# Patient Record
Sex: FEMALE | Race: ASIAN | NOT HISPANIC OR LATINO | Employment: FULL TIME | URBAN - METROPOLITAN AREA
[De-identification: names, ages, dates, MRNs, and addresses within clinical notes are randomized per-mention and may not be internally consistent; named-entity substitution may affect disease eponyms.]

---

## 2023-11-03 ENCOUNTER — OFFICE VISIT (OUTPATIENT)
Dept: ENDOCRINOLOGY | Facility: CLINIC | Age: 23
End: 2023-11-03
Payer: COMMERCIAL

## 2023-11-03 VITALS
BODY MASS INDEX: 18.69 KG/M2 | TEMPERATURE: 97.8 F | WEIGHT: 101.6 LBS | SYSTOLIC BLOOD PRESSURE: 110 MMHG | OXYGEN SATURATION: 98 % | DIASTOLIC BLOOD PRESSURE: 62 MMHG | HEIGHT: 62 IN | HEART RATE: 102 BPM

## 2023-11-03 DIAGNOSIS — E01.0 THYROMEGALY: ICD-10-CM

## 2023-11-03 DIAGNOSIS — E55.9 VITAMIN D DEFICIENCY: ICD-10-CM

## 2023-11-03 DIAGNOSIS — E05.90 HYPERTHYROIDISM: Primary | ICD-10-CM

## 2023-11-03 PROCEDURE — 99204 OFFICE O/P NEW MOD 45 MIN: CPT | Performed by: INTERNAL MEDICINE

## 2023-11-03 RX ORDER — METHIMAZOLE 5 MG/1
2.5 TABLET ORAL DAILY
COMMUNITY

## 2023-11-03 NOTE — PROGRESS NOTES
New patient Note      CC: Hyperthyroidism    History of Present Illness:   21 yr female with recently diagnosed hyperthyroidism. She was started on methimazole 5mg PO BID 3/23 and currently on 2.5mg qdaily. In August, she stopped methimazole for a couple of weeks and felt fatigued. No weight changes. She reports occasional palpitation but mostly asymptomatic. FHx: MGM and aunt had thyroid dysfunction. There is no problem list on file for this patient. History reviewed. No pertinent past medical history. History reviewed. No pertinent surgical history. History reviewed. No pertinent family history. Social History     Tobacco Use    Smoking status: Never    Smokeless tobacco: Never   Substance Use Topics    Alcohol use: Not Currently     No Known Allergies      Current Outpatient Medications:     methimazole (TAPAZOLE) 5 mg tablet, Take 2.5 mg by mouth daily, Disp: , Rfl:     Review of Systems   HENT: Negative. Eyes: Negative. Respiratory: Negative. Cardiovascular: Negative. Gastrointestinal: Negative. Endocrine: Negative. Musculoskeletal: Negative. Skin: Negative. Allergic/Immunologic: Negative. Neurological: Negative. Hematological: Negative. Psychiatric/Behavioral: Negative. Physical Exam:  Body mass index is 18.58 kg/m². /62 (BP Location: Right arm, Patient Position: Sitting, Cuff Size: Standard)   Pulse 102   Temp 97.8 °F (36.6 °C) (Tympanic)   Ht 5' 2" (1.575 m)   Wt 46.1 kg (101 lb 9.6 oz)   SpO2 98%   BMI 18.58 kg/m²    Vitals:    11/03/23 1302   Weight: 46.1 kg (101 lb 9.6 oz)        Physical Exam  Constitutional:       General: She is not in acute distress. Appearance: She is well-developed. She is not ill-appearing. HENT:      Head: Normocephalic and atraumatic. Nose: Nose normal.      Mouth/Throat:      Pharynx: Oropharynx is clear. Eyes:      Extraocular Movements: Extraocular movements intact. Conjunctiva/sclera: Conjunctivae normal.   Neck:      Thyroid: No thyromegaly. Cardiovascular:      Rate and Rhythm: Normal rate. Pulmonary:      Effort: Pulmonary effort is normal.   Musculoskeletal:         General: No deformity. Cervical back: Normal range of motion. Skin:     Capillary Refill: Capillary refill takes less than 2 seconds. Coloration: Skin is not pale. Findings: No rash. Neurological:      Mental Status: She is alert and oriented to person, place, and time. Psychiatric:         Behavior: Behavior normal.         Labs:   No results found for: "HGBA1C"    No results found for: "[id number]", "TSH", "E4LWSNF", "W9WEVGI", "THYROIDAB"    No results found for: "CREATININE", "BUN", "NA", "K", "CL", "CO2"  No results found for: "EGFR"    No results found for: "ALT", "AST", "GGT", "ALKPHOS", "BILITOT"    No results found for: "CHOLESTEROL"  No results found for: "HDL"  No results found for: "TRIG"  No results found for: "NONHDLC"      Impression:  1. Hyperthyroidism    2. Vitamin D deficiency    3. Thyromegaly         Plan:    Cheryle Perkins was seen today for new patient visit. Diagnoses and all orders for this visit:    Hyperthyroidism. She seems to be stable on methimazole 2.5mg daily. Today we discussed all aspects of hyperthyroidism including pathophysiology, complications including orbital, cardiac, bone and metabolic, treatment options including methimazole, FLORES and surgery, goals of therapy and follow up needs with endocrine and ophthalmology. She has thyromegaly on exam with lt asymmetry - check US thyroid. Follow up in 3 months. -     T4, free; Future  -     TSH, 3rd generation; Future  -     T3; Future  -     Thyroid Antibodies Panel; Future  -     Thyroid stimulating immunoglobulin; Future    Vitamin D deficiency. Take OTC vit d3 2000Iu daily. Thyromegaly  -     US thyroid;  Future        I have spent 41 minutes with patient today in which greater than 50% of this time was spent in counseling/coordination of care. Discussed with the patient and all questioned fully answered. She will call me if any problems arise. Educated/ Counseled patient on diagnostic test results, prognosis, risk vs benefit of treatment options, importance of treatment compliance, healthy life and lifestyle choices.       Adwoa Boone

## 2023-11-10 ENCOUNTER — HOSPITAL ENCOUNTER (OUTPATIENT)
Dept: ULTRASOUND IMAGING | Facility: HOSPITAL | Age: 23
Discharge: HOME | End: 2023-11-10
Payer: COMMERCIAL

## 2023-11-10 DIAGNOSIS — E01.0 THYROMEGALY: ICD-10-CM

## 2023-11-10 PROCEDURE — 76536 US EXAM OF HEAD AND NECK: CPT

## 2023-12-07 ENCOUNTER — TRANSCRIBE ORDERS (OUTPATIENT)
Dept: LAB | Facility: HOSPITAL | Age: 23
End: 2023-12-07

## 2023-12-07 ENCOUNTER — APPOINTMENT (OUTPATIENT)
Dept: LAB | Facility: HOSPITAL | Age: 23
End: 2023-12-07
Payer: COMMERCIAL

## 2023-12-07 DIAGNOSIS — E55.9 VITAMIN D DEFICIENCY: ICD-10-CM

## 2023-12-07 DIAGNOSIS — Z01.818 PRE-OPERATIVE EXAMINATION: Primary | ICD-10-CM

## 2023-12-07 DIAGNOSIS — E05.90 HYPERTHYROIDISM: ICD-10-CM

## 2023-12-07 LAB
25(OH)D3 SERPL-MCNC: 11.7 NG/ML (ref 30–100)
T3 SERPL-MCNC: 3.5 NG/ML
T4 FREE SERPL-MCNC: 3.59 NG/DL (ref 0.61–1.12)
TSH SERPL DL<=0.05 MIU/L-ACNC: <0.01 UIU/ML (ref 0.45–4.5)

## 2023-12-07 PROCEDURE — 86800 THYROGLOBULIN ANTIBODY: CPT

## 2023-12-07 PROCEDURE — 84445 ASSAY OF TSI GLOBULIN: CPT

## 2023-12-07 PROCEDURE — 36415 COLL VENOUS BLD VENIPUNCTURE: CPT

## 2023-12-07 PROCEDURE — 84443 ASSAY THYROID STIM HORMONE: CPT

## 2023-12-07 PROCEDURE — 84439 ASSAY OF FREE THYROXINE: CPT

## 2023-12-07 PROCEDURE — 84480 ASSAY TRIIODOTHYRONINE (T3): CPT

## 2023-12-07 PROCEDURE — 86376 MICROSOMAL ANTIBODY EACH: CPT

## 2023-12-07 PROCEDURE — 82306 VITAMIN D 25 HYDROXY: CPT

## 2023-12-08 LAB
THYROGLOB AB SERPL-ACNC: 30.3 IU/ML (ref 0–0.9)
THYROPEROXIDASE AB SERPL-ACNC: 215 IU/ML (ref 0–34)

## 2023-12-10 LAB — TSI SER-ACNC: 31.1 IU/L (ref 0–0.55)

## 2023-12-15 ENCOUNTER — DOCUMENTATION (OUTPATIENT)
Dept: OTHER | Facility: HOSPITAL | Age: 23
End: 2023-12-15

## 2023-12-15 DIAGNOSIS — E05.90 HYPERTHYROIDISM: Primary | ICD-10-CM

## 2023-12-29 DIAGNOSIS — E05.00 GRAVES DISEASE: Primary | ICD-10-CM

## 2023-12-29 DIAGNOSIS — E05.90 HYPERTHYROIDISM: ICD-10-CM

## 2024-02-05 ENCOUNTER — OFFICE VISIT (OUTPATIENT)
Dept: ENDOCRINOLOGY | Facility: CLINIC | Age: 24
End: 2024-02-05
Payer: COMMERCIAL

## 2024-02-05 VITALS
DIASTOLIC BLOOD PRESSURE: 62 MMHG | HEART RATE: 105 BPM | TEMPERATURE: 99.7 F | HEIGHT: 62 IN | WEIGHT: 111 LBS | OXYGEN SATURATION: 98 % | SYSTOLIC BLOOD PRESSURE: 118 MMHG | RESPIRATION RATE: 12 BRPM | BODY MASS INDEX: 20.43 KG/M2

## 2024-02-05 DIAGNOSIS — E05.00 GRAVES DISEASE: Primary | ICD-10-CM

## 2024-02-05 DIAGNOSIS — E01.0 THYROMEGALY: ICD-10-CM

## 2024-02-05 DIAGNOSIS — E55.9 VITAMIN D DEFICIENCY: ICD-10-CM

## 2024-02-05 PROBLEM — E05.90 HYPERTHYROIDISM: Status: ACTIVE | Noted: 2024-02-05

## 2024-02-05 PROCEDURE — 99214 OFFICE O/P EST MOD 30 MIN: CPT | Performed by: INTERNAL MEDICINE

## 2024-02-05 RX ORDER — CHOLECALCIFEROL (VITAMIN D3) 1250 MCG
50000 CAPSULE ORAL WEEKLY
COMMUNITY
Start: 2024-01-22

## 2024-02-05 RX ORDER — METHIMAZOLE 10 MG/1
10 TABLET ORAL 3 TIMES DAILY
COMMUNITY
Start: 2023-12-25

## 2024-02-05 NOTE — ASSESSMENT & PLAN NOTE
She seems clinically stable. TSH remains undetectable and fT4 was low normal.  She still has thyromegaly and goiter on exam.    Today we agreed to continue methimazole 10mg qdaily and repeat labs in 6-8 weeks.    Follow up in 3 months.

## 2024-02-05 NOTE — PROGRESS NOTES
"  Follow-up Patient Progress Note      CC: Hyperthyroidism     History of Present Illness:   23 yr female with recently diagnosed hyperthyroidism.     She was increased to methimazole 10mg PO daily 12/7/23 Based on undetectable TSH and fT4 3ng/dL. TSI was  elevated.     She reports occasional palpitation but mostly asymptomatic.     FHx: MGM and aunt had thyroid dysfunction.      Physical Exam:  Body mass index is 20.3 kg/m².  /62 (BP Location: Left arm, Patient Position: Sitting)   Pulse 105   Temp 99.7 °F (37.6 °C) (Tympanic)   Resp 12   Ht 5' 2\" (1.575 m)   Wt 50.3 kg (111 lb)   SpO2 98%   BMI 20.30 kg/m²    Vitals:    02/05/24 1308   Weight: 50.3 kg (111 lb)        Physical Exam  Constitutional:       General: She is not in acute distress.     Appearance: She is well-developed. She is not ill-appearing.   HENT:      Head: Normocephalic and atraumatic.      Nose: Nose normal.      Mouth/Throat:      Pharynx: Oropharynx is clear.   Eyes:      Extraocular Movements: Extraocular movements intact.      Conjunctiva/sclera: Conjunctivae normal.   Neck:      Thyroid: No thyromegaly.   Cardiovascular:      Rate and Rhythm: Normal rate.   Pulmonary:      Effort: Pulmonary effort is normal.   Musculoskeletal:         General: No deformity.      Cervical back: Normal range of motion.   Skin:     Capillary Refill: Capillary refill takes less than 2 seconds.      Coloration: Skin is not pale.      Findings: No rash.   Neurological:      Mental Status: She is alert and oriented to person, place, and time.   Psychiatric:         Behavior: Behavior normal.         Labs:   No results found for: \"HGBA1C\"    Lab Results   Component Value Date    [id number] <0.010 (L) 12/07/2023    S1FZNCZ 3.5 (H) 12/07/2023       No results found for: \"CREATININE\", \"BUN\", \"NA\", \"K\", \"CL\", \"CO2\"  No results found for: \"EGFR\"    No results found for: \"ALT\", \"AST\", \"GGT\", \"ALKPHOS\", \"BILITOT\"    No results found for: \"CHOLESTEROL\"  No " "results found for: \"HDL\"  No results found for: \"TRIG\"  No results found for: \"NONHDLC\"      Assessment/Plan:    Problem List Items Addressed This Visit          Endocrine    Graves disease - Primary     She seems clinically stable. TSH remains undetectable and fT4 was low normal.  She still has thyromegaly and goiter on exam.    Today we agreed to continue methimazole 10mg qdaily and repeat labs in 6-8 weeks.    Follow up in 3 months.         Relevant Medications    methimazole (TAPAZOLE) 10 mg tablet    Other Relevant Orders    T4, free    TSH, 3rd generation    T3    Thyroid stimulating immunoglobulin    Thyromegaly     Seems symmetric and non tender to exam today.    Will check TSI.         Relevant Medications    methimazole (TAPAZOLE) 10 mg tablet       Other    Vitamin D deficiency     Take vit D3 200IU daily OTC.              I have spent a total time of 31 minutes on 02/05/24 in caring for this patient including greater than 50% of this time was spent in counseling/coordination of care as listed above.       Discussed with the patient and all questioned fully answered. She will contact me with concerns.    Fouzia Mejai"

## 2024-02-09 PROBLEM — J34.89 NASAL OBSTRUCTION: Status: ACTIVE | Noted: 2024-02-09

## 2024-02-09 PROBLEM — J34.2 NASAL SEPTAL DEVIATION: Status: ACTIVE | Noted: 2024-02-09

## 2024-02-09 PROBLEM — J34.3 NASAL TURBINATE HYPERTROPHY: Status: ACTIVE | Noted: 2024-02-09

## 2024-04-15 DIAGNOSIS — E05.00 GRAVES DISEASE: Primary | ICD-10-CM

## 2024-04-17 RX ORDER — METHIMAZOLE 10 MG/1
10 TABLET ORAL DAILY
Qty: 90 TABLET | Refills: 0 | Status: SHIPPED | OUTPATIENT
Start: 2024-04-17

## 2024-04-18 NOTE — PROGRESS NOTES
ADULT ANNUAL PHYSICAL  [facility] [place]    NAME: Racquel Holguin  AGE: 23 y.o. SEX: female  : 2000     DATE: 2024     Assessment and Plan:     Problem List Items Addressed This Visit        Respiratory    Nasal septal deviation     Patient underwent surgery for septal deviation but feels it is not helping her much she still has a problem in the night especially with the breathing.  Recommend follow-up with the ENT.            Endocrine    Graves disease - Primary     As mentioned in HPI patient with hypothyroidism TSH still remains undetectable on methimazole 10 mg daily patient will need a follow-up labs also can to be seen by the endocrinologist in July.  Patient at present time still not feeling well she thinks the methimazole dose may be too high for her.  Recommend patient to call the endocrinology and discuss the symptoms maybe she need to have a lower dose         Thyromegaly     Patient thyroid is symmetrical nontender            Other    Vitamin D deficiency     Continue with the vitamin D supplements currently taking 50,000 units weekly she can switch to 2000 units daily after that        Other Visit Diagnoses     Screening for deficiency anemia        Pre-diabetes        Relevant Orders    Comprehensive metabolic panel    Hemoglobin A1C    Urinalysis with microscopic    Screening for thyroid disorder        Dyslipidemia        Relevant Orders    Lipid panel    Encounter for vitamin deficiency screening              Immunizations and preventive care screenings were discussed with patient today. Appropriate education was printed on patient's after visit summary.    Counseling:  Patient with a history of hypothyroidism currently on methimazole being followed by the endocrinologist      Depression Screening and Follow-up Plan: Patient was screened for depression during today's encounter. They screened negative with a PHQ-2 score of 0.        Return in  about 6 months (around 10/19/2024).     Chief Complaint:   Patient with a diagnosis of hypothyroidism has come in for a checkup follow-up  Chief Complaint   Patient presents with   • Establish Care   • Annual Exam      History of Present Illness:   Patient has been diagnosed with hypothyroidism in 2023 she was in [place] and she has mood to the [facility] ED recently and wants to establish a primary care physician.  She is being followed by the endocrinologist also recently her methimazole dose which is being given for her hypothyroidism has been increased from 2.5 to 10 mg I reviewed the notes of the endocrinologist.  Adult Annual Physical   Patient here for a comprehensive physical exam. The patient reports no problems.    Diet and Physical Activity  Diet/Nutrition: well balanced diet, limited junk food, and limited fruits/vegetables.   Exercise: walking, 1-2 times a week on average, and less than 30 minutes on average.      Depression Screening  PHQ-2/9 Depression Screening    Little interest or pleasure in doing things: 0 - not at all  Feeling down, depressed, or hopeless: 0 - not at all  Trouble falling or staying asleep, or sleeping too much: 0 - not at all  Feeling tired or having little energy: 0 - not at all  Poor appetite or overeatin - not at all  Feeling bad about yourself - or that you are a failure or have let yourself or your family down: 0 - not at all  Trouble concentrating on things, such as reading the newspaper or watching television: 0 - not at all  Moving or speaking so slowly that other people could have noticed. Or the opposite - being so fidgety or restless that you have been moving around a lot more than usual: 0 - not at all  Thoughts that you would be better off dead, or of hurting yourself in some way: 0 - not at all  PHQ-2 Score: 0  PHQ-2 Interpretation: Negative depression screen  PHQ-9 Score: 0  PHQ-9 Interpretation: No or Minimal depression       General Health  Sleep:  sleeps poorly, gets 7-8 hours of sleep on average, and experiences daytime hypersomnolence.   Hearing: normal - bilateral.  Vision: Recommend evaluation by the ophthalmologist in the setting of hypothyroidism..   Dental: regular dental visits, brushes teeth twice daily, and flosses teeth occasionally.       /GYN Health  Follows with gynecology? no   Last menstrual period: 03/26/2024  Contraceptive method:  none .  History of STDs?: no.     Advanced Care Planning  Do you have an advanced directive? no  Do you have a durable medical power of ? no  ACP document given to the patient? no      Review of Systems:     Review of Systems   Constitutional:  Negative for chills and fever.   HENT:  Negative for ear pain and sore throat.    Eyes:  Negative for pain and visual disturbance.   Respiratory:  Negative for cough and shortness of breath.    Cardiovascular:  Negative for chest pain and palpitations.   Gastrointestinal:  Negative for abdominal pain and vomiting.   Genitourinary:  Negative for dysuria and hematuria.   Musculoskeletal:  Negative for arthralgias and back pain.   Skin:  Negative for color change and rash.   Neurological:  Negative for seizures and syncope.   All other systems reviewed and are negative.     Past Medical History:     Past Medical History:   Diagnosis Date   • Allergies    • Thyroid disorder       Past Surgical History:     History reviewed. No pertinent surgical history.   Social History:     Social History     Socioeconomic History   • Marital status: Single     Spouse name: None   • Number of children: None   • Years of education: None   • Highest education level: None   Occupational History   • None   Tobacco Use   • Smoking status: Never   • Smokeless tobacco: Never   Vaping Use   • Vaping status: Never Used   Substance and Sexual Activity   • Alcohol use: Not Currently   • Drug use: Never   • Sexual activity: None   Other Topics Concern   • None   Social History Narrative   •  "None     Social Determinants of Health     Financial Resource Strain: Not on file   Food Insecurity: Not on file   Transportation Needs: Not on file   Physical Activity: Not on file   Stress: Not on file   Social Connections: Not on file   Intimate Partner Violence: Not on file   Housing Stability: Not on file      Family History:     History reviewed. No pertinent family history.   Current Medications:     Current Outpatient Medications   Medication Sig Dispense Refill   • Cholecalciferol (Vitamin D3) 1.25 MG (85235 UT) CAPS Take 50,000 Units by mouth once a week     • methimazole (TAPAZOLE) 10 mg tablet Take 1 tablet (10 mg total) by mouth in the morning 90 tablet 0     No current facility-administered medications for this visit.      Allergies:     No Known Allergies   Physical Exam:     /66 (BP Location: Right arm, Patient Position: Sitting, Cuff Size: Standard)   Pulse 85   Ht 5' 2\" (1.575 m)   Wt 49.1 kg (108 lb 4.8 oz)   SpO2 99%   BMI 19.81 kg/m²     Physical Exam  Vitals and nursing note reviewed.   Constitutional:       General: She is not in acute distress.     Appearance: She is well-developed.   HENT:      Head: Normocephalic and atraumatic.   Eyes:      Conjunctiva/sclera: Conjunctivae normal.   Neck:      Comments: Symmetrical thyroid enlargement noted  Cardiovascular:      Rate and Rhythm: Normal rate and regular rhythm.      Heart sounds: No murmur heard.  Pulmonary:      Effort: Pulmonary effort is normal. No respiratory distress.      Breath sounds: Normal breath sounds.   Abdominal:      Palpations: Abdomen is soft.      Tenderness: There is no abdominal tenderness.   Musculoskeletal:         General: No swelling.      Cervical back: Neck supple.   Skin:     General: Skin is warm and dry.      Capillary Refill: Capillary refill takes less than 2 seconds.   Neurological:      General: No focal deficit present.      Mental Status: She is alert and oriented to person, place, and time. "   Psychiatric:         Mood and Affect: Mood normal.      No results found for this or any previous visit (from the past 1344 hour(s)).     Eugenia Travis MD   [facility] [place]

## 2024-04-19 ENCOUNTER — OFFICE VISIT (OUTPATIENT)
Dept: FAMILY MEDICINE CLINIC | Facility: CLINIC | Age: 24
End: 2024-04-19
Payer: COMMERCIAL

## 2024-04-19 VITALS
OXYGEN SATURATION: 99 % | SYSTOLIC BLOOD PRESSURE: 118 MMHG | DIASTOLIC BLOOD PRESSURE: 66 MMHG | HEART RATE: 85 BPM | HEIGHT: 62 IN | BODY MASS INDEX: 19.93 KG/M2 | WEIGHT: 108.3 LBS

## 2024-04-19 DIAGNOSIS — J34.2 NASAL SEPTAL DEVIATION: ICD-10-CM

## 2024-04-19 DIAGNOSIS — R73.03 PRE-DIABETES: ICD-10-CM

## 2024-04-19 DIAGNOSIS — E78.5 DYSLIPIDEMIA: ICD-10-CM

## 2024-04-19 DIAGNOSIS — Z13.0 SCREENING FOR DEFICIENCY ANEMIA: ICD-10-CM

## 2024-04-19 DIAGNOSIS — Z13.21 ENCOUNTER FOR VITAMIN DEFICIENCY SCREENING: ICD-10-CM

## 2024-04-19 DIAGNOSIS — E05.00 GRAVES DISEASE: Primary | ICD-10-CM

## 2024-04-19 DIAGNOSIS — Z13.29 SCREENING FOR THYROID DISORDER: ICD-10-CM

## 2024-04-19 DIAGNOSIS — E01.0 THYROMEGALY: ICD-10-CM

## 2024-04-19 DIAGNOSIS — E55.9 VITAMIN D DEFICIENCY: ICD-10-CM

## 2024-04-19 PROCEDURE — 99385 PREV VISIT NEW AGE 18-39: CPT | Performed by: INTERNAL MEDICINE

## 2024-04-19 NOTE — ASSESSMENT & PLAN NOTE
Patient underwent surgery for septal deviation but feels it is not helping her much she still has a problem in the night especially with the breathing.  Recommend follow-up with the ENT.

## 2024-04-19 NOTE — ASSESSMENT & PLAN NOTE
As mentioned in HPI patient with hypothyroidism TSH still remains undetectable on methimazole 10 mg daily patient will need a follow-up labs also can to be seen by the endocrinologist in July.  Patient at present time still not feeling well she thinks the methimazole dose may be too high for her.  Recommend patient to call the endocrinology and discuss the symptoms maybe she need to have a lower dose

## 2024-04-19 NOTE — ASSESSMENT & PLAN NOTE
Continue with the vitamin D supplements currently taking 50,000 units weekly she can switch to 2000 units daily after that

## 2024-04-29 DIAGNOSIS — E05.00 GRAVES DISEASE: ICD-10-CM

## 2024-04-29 DIAGNOSIS — E05.90 HYPERTHYROIDISM: Primary | ICD-10-CM

## 2024-04-29 RX ORDER — METHIMAZOLE 10 MG/1
5 TABLET ORAL DAILY
Qty: 90 TABLET | Refills: 0 | Status: SHIPPED | OUTPATIENT
Start: 2024-04-29

## 2024-07-15 DIAGNOSIS — E05.00 GRAVES DISEASE: ICD-10-CM

## 2024-07-15 RX ORDER — METHIMAZOLE 10 MG/1
10 TABLET ORAL DAILY
Qty: 90 TABLET | Refills: 0 | Status: SHIPPED | OUTPATIENT
Start: 2024-07-15

## 2024-09-04 ENCOUNTER — TELEPHONE (OUTPATIENT)
Dept: ENDOCRINOLOGY | Facility: CLINIC | Age: 24
End: 2024-09-04

## 2024-10-09 DIAGNOSIS — E05.00 GRAVES DISEASE: Primary | ICD-10-CM

## 2024-10-22 ENCOUNTER — OFFICE VISIT (OUTPATIENT)
Dept: ENDOCRINOLOGY | Facility: CLINIC | Age: 24
End: 2024-10-22
Payer: COMMERCIAL

## 2024-10-22 ENCOUNTER — TELEPHONE (OUTPATIENT)
Dept: ENDOCRINOLOGY | Facility: CLINIC | Age: 24
End: 2024-10-22

## 2024-10-22 VITALS
HEIGHT: 62 IN | SYSTOLIC BLOOD PRESSURE: 88 MMHG | TEMPERATURE: 97.9 F | OXYGEN SATURATION: 98 % | DIASTOLIC BLOOD PRESSURE: 56 MMHG | WEIGHT: 104 LBS | BODY MASS INDEX: 19.14 KG/M2 | HEART RATE: 70 BPM

## 2024-10-22 DIAGNOSIS — E05.00 GRAVES DISEASE: Primary | ICD-10-CM

## 2024-10-22 DIAGNOSIS — E55.9 VITAMIN D DEFICIENCY: ICD-10-CM

## 2024-10-22 DIAGNOSIS — E04.1 THYROID NODULE: ICD-10-CM

## 2024-10-22 PROCEDURE — 99214 OFFICE O/P EST MOD 30 MIN: CPT | Performed by: INTERNAL MEDICINE

## 2024-10-22 NOTE — PROGRESS NOTES
"    Follow-up Patient Progress Note      CC: Hyperthyroidism     History of Present Illness:   23 yr female with recently diagnosed hyperthyroidism with elevated TSI and thyroid nodule. Last visit was 2/5/24.    She lost 7 lbs ishmael last 6 months.     She is now on methimazole 5mg PO daily since 10/9/24.  TSI was  elevated.     She reports occasional palpitation but mostly asymptomatic.     FHx: MGM and aunt had thyroid dysfunction.     11/10/23 US thyroid: Nodule #1. RIGHT midgland nodule 0.7 x 0.3 x 0.5 cm.  TR 4     Physical Exam:  Body mass index is 19.02 kg/m².  BP (!) 88/56 (BP Location: Left arm, Patient Position: Sitting, Cuff Size: Standard)   Pulse 70   Temp 97.9 °F (36.6 °C) (Temporal)   Ht 5' 2\" (1.575 m)   Wt 47.2 kg (104 lb)   SpO2 98%   BMI 19.02 kg/m²    Vitals:    10/22/24 0839   Weight: 47.2 kg (104 lb)        Physical Exam  Constitutional:       General: She is not in acute distress.     Appearance: She is well-developed.   HENT:      Head: Normocephalic and atraumatic.      Nose: Nose normal.   Eyes:      Conjunctiva/sclera: Conjunctivae normal.   Pulmonary:      Effort: Pulmonary effort is normal.   Abdominal:      General: There is no distension.   Musculoskeletal:      Cervical back: Normal range of motion and neck supple.   Skin:     Findings: No rash.      Comments: No icterus   Neurological:      Mental Status: She is alert and oriented to person, place, and time.       Labs:   No results found for: \"HGBA1C\"    Lab Results   Component Value Date    [id number] <0.010 (L) 12/07/2023    V1HLPSF 3.5 (H) 12/07/2023       No results found for: \"CREATININE\", \"BUN\", \"NA\", \"K\", \"CL\", \"CO2\"  No results found for: \"EGFR\"    No results found for: \"ALT\", \"AST\", \"GGT\", \"ALKPHOS\", \"BILITOT\"    No results found for: \"CHOLESTEROL\"  No results found for: \"HDL\"  No results found for: \"TRIG\"  No results found for: \"NONHDLC\"      Assessment/Plan:    1. Graves disease  Assessment & Plan:  She has been " fluctuating both clinically and biochemically.    She still has thyromegaly and goiter on exam. Prior TSI was positive.    I am concerned she has a waxing and waning course. Also note a solitary thyroid nodule.    Today we agreed to continue methimazole 5mg qdaily and repeat labs every 8 weeks.    Follow up in 3 months.  Orders:  -     Thyroid stimulating immunoglobulin; Future; Expected date: 12/22/2024  -     T4, free; Future; Expected date: 12/22/2024  -     TSH, 3rd generation; Future; Expected date: 12/22/2024  -     T3; Future; Expected date: 12/22/2024  -     Thyroid stimulating immunoglobulin  -     T4, free  -     TSH, 3rd generation  -     T3  -     Comprehensive metabolic panel; Future; Expected date: 12/22/2024  -     CBC and differential; Future; Expected date: 12/22/2024  -     Comprehensive metabolic panel  -     CBC and differential  -     US thyroid; Future; Expected date: 12/22/2024  2. Thyroid nodule  Assessment & Plan:  11/10/23 US thyroid: Nodule #1. RIGHT midgland nodule 0.7 x 0.3 x 0.5 cm.  TR 4     Will repeat US thyroid.  May consider an uptake scan in future but US showed heterogenous gland with generalized hyperemia and thyromegaly suggesting graves disease.  Orders:  -     US thyroid; Future; Expected date: 12/22/2024  3. Vitamin D deficiency        I have spent a total time of 30 minutes on 10/22/24 in caring for this patient including greater than 50% of this time was spent in counseling/coordination of care as listed above.       Discussed with the patient and all questioned fully answered. She will contact me with concerns.    Fouzia Mejia

## 2024-10-22 NOTE — ASSESSMENT & PLAN NOTE
She has been fluctuating both clinically and biochemically.    She still has thyromegaly and goiter on exam. Prior TSI was positive.    I am concerned she has a waxing and waning course. Also note a solitary thyroid nodule.    Today we agreed to continue methimazole 5mg qdaily and repeat labs every 8 weeks.    Follow up in 3 months.

## 2024-10-22 NOTE — ASSESSMENT & PLAN NOTE
11/10/23 US thyroid: Nodule #1. RIGHT midgland nodule 0.7 x 0.3 x 0.5 cm.  TR 4     Will repeat US thyroid.  May consider an uptake scan in future but US showed heterogenous gland with generalized hyperemia and thyromegaly suggesting graves disease.

## 2024-10-31 NOTE — PROGRESS NOTES
Office Visit Note  24     Racquel Holguin 24 y.o. female MRN: [id number]  : 2000    Assessment:     1. Snoring  Assessment & Plan:  As mentioned in the history of present illness patient is snoring with tired feeling when she wakes up in the morning.  Daytime also she feels very quickly she can go to sleep.  No apneic spells were noted.  I reviewed the reports from the [facility] physician reviewed the referral for the same for getting a sleep study done.  Orders:  -     Ambulatory Referral to Sleep Medicine; Future  2. Graves disease  Assessment & Plan:  Endocrinologist note appreciated patient is fluctuating both clinically and biochemically with the thyroid still has goiter and thyromegaly currently patient is taking methimazole 5 mg daily we will continue with the same follow-up with the endocrinologist with repeat labs and also thyroid ultrasound.  3. Nasal septal deviation  Assessment & Plan:  Patient status post surgery for septal deviation 8 months back I reviewed the reports from ENT she has seen recently with recommended Flonase nasal spray for 12 weeks however patient has used for few weeks and had to stop it she did not want to get dependent on that currently feeling okay  4. Vitamin D deficiency  5. Thyroid nodule  Assessment & Plan:  Patient with thyroid nodule seen on the ultrasound done in  number it has shown heterogeneous gland with generalized hyperemia and thyromegaly suggesting Graves' disease patient to have repeat ultrasound done in couple of months.             Discussion Summary and Plan:  Today's care plan and medications were reviewed with patient in detail and all their questions answered to their satisfaction.    Chief Complaint   Patient presents with    Insomnia      Subjective:  Patient is coming here for a follow-up evaluation with a history of Graves' disease currently on methimazole being followed by the endocrinologist.  Patient also had nasal septal  deviation had surgery about 8 months ago and has been followed by the ENT who is here in the recent past as recommended Flonase nasal spray for 12 weeks however she has taken few weeks and stopped it feeling better she does have some allergies now.    Patient has symptoms and signs suggestive of sleep apnea including snoring and feeling very tired in the morning as if she has not not enough sleep.  No witnessed apnea spells.  She was seen by the sleep medicine physician at [facility] was recommended sleep study patient would like to have referral for the same.  She could not get an appointment in [facility] for 1 year         The following portions of the patient's history were reviewed and updated as appropriate: allergies, current medications, past family history, past medical history, past social history, past surgical history and problem list.    Review of Systems   Constitutional:  Negative for chills and fever.   HENT:  Negative for ear pain and sore throat.    Eyes:  Negative for pain and visual disturbance.   Respiratory:  Negative for cough and shortness of breath.    Cardiovascular:  Negative for chest pain and palpitations.   Gastrointestinal:  Negative for abdominal pain and vomiting.   Genitourinary:  Negative for dysuria and hematuria.   Musculoskeletal:  Negative for arthralgias and back pain.   Skin:  Negative for color change and rash.   Neurological:  Negative for seizures and syncope.   All other systems reviewed and are negative.        Historical Information   Patient Active Problem List   Diagnosis    Graves disease    Thyroid nodule    Vitamin D deficiency    Nasal obstruction    Nasal septal deviation    Nasal turbinate hypertrophy    Snoring     Past Medical History:   Diagnosis Date    Allergies     Thyroid disorder      History reviewed. No pertinent surgical history.  Social History     Substance and Sexual Activity   Alcohol Use Not Currently     Social History  "    Substance and Sexual Activity   Drug Use Never     Social History     Tobacco Use   Smoking Status Never   Smokeless Tobacco Never     History reviewed. No pertinent family history.  Health Maintenance Due   Topic    Hepatitis C Screening     HIV Screening     HPV Vaccine (1 - 3-dose series)    DTaP,Tdap,and Td Vaccines (1 - Tdap)    Cervical Cancer Screening     Influenza Vaccine (1)    Depression Screening       Meds/Allergies       Current Outpatient Medications:     fluticasone (FLONASE) 50 mcg/act nasal spray, SPRAY 1 SPRAY INTO EACH NOSTRIL EVERY DAY, Disp: 48 mL, Rfl: 2    methimazole (TAPAZOLE) 10 mg tablet, Take 1 tablet (10 mg total) by mouth in the morning (Patient taking differently: Take 5 mg by mouth in the morning), Disp: 90 tablet, Rfl: 0    Cholecalciferol (Vitamin D3) 1.25 MG (35672 UT) CAPS, Take 50,000 Units by mouth once a week (Patient not taking: Reported on 10/22/2024), Disp: , Rfl:       Objective:    Vitals:   /56 (BP Location: Right arm, Patient Position: Sitting, Cuff Size: Standard)   Pulse 105   Ht 5' 2\" (1.575 m)   Wt 48.3 kg (106 lb 6.4 oz)   SpO2 98%   BMI 19.46 kg/m²   Body mass index is 19.46 kg/m².  Vitals:    11/01/24 1150   Weight: 48.3 kg (106 lb 6.4 oz)       Physical Exam  Vitals and nursing note reviewed.   Constitutional:       Appearance: Normal appearance.   Cardiovascular:      Rate and Rhythm: Normal rate and regular rhythm.      Heart sounds: Normal heart sounds.   Pulmonary:      Effort: Pulmonary effort is normal.      Breath sounds: Normal breath sounds.   Musculoskeletal:         General: Normal range of motion.      Cervical back: Normal range of motion and neck supple.      Right lower leg: No edema.      Left lower leg: No edema.   Skin:     General: Skin is warm.   Neurological:      General: No focal deficit present.      Mental Status: She is alert and oriented to person, place, and time.   Psychiatric:         Mood and Affect: Mood normal.    " "     Behavior: Behavior normal.         Lab Review   No visits with results within 2 Month(s) from this visit.   Latest known visit with results is:   Lab on 12/07/2023   Component Date Value Ref Range Status    Free T4 12/07/2023 3.59 (H)  0.61 - 1.12 ng/dL Final    Specimens with biotin concentrations > 10 ng/mL can lead to significant (> 10%) positive bias in result.    TSH 3RD GENERATON 12/07/2023 <0.010 (L)  0.450 - 4.500 uIU/mL Final    The recommended reference ranges for TSH during pregnancy are as follows:   First trimester 0.100 to 2.500 uIU/mL   Second trimester  0.200 to 3.000 uIU/mL   Third trimester 0.300 to 3.000 uIU/m    Note: Normal ranges may not apply to patients who are transgender, non-binary, or whose legal sex, sex at birth, and gender identity differ.  Adult TSH (3rd generation) reference range follows the recommended guidelines of the American Thyroid Association, January, 2020.    T3, Total 12/07/2023 3.5 (H)  0.9-1.8 ng/mL ng/mL Final    TSI 12/07/2023 31.10 (H)  0.00 - 0.55 IU/L Final    Vit D, 25-Hydroxy 12/07/2023 11.7 (L)  30.0 - 100.0 ng/mL Final    Vitamin D guidelines established by Clinical Guidelines Subcommittee  of the Endocrine Society Task Force, 2011    Deficiency <20ng/ml   Insufficiency 20-30ng/ml   Sufficient  ng/ml     THYROID MICROSOMAL ANTIBODY 12/07/2023 215 (H)  0 - 34 IU/mL Final    Thyroglobulin Ab 12/07/2023 30.3 (H)  0.0 - 0.9 IU/mL Final    Thyroglobulin Antibody measured by [facility]         Eugenia Travis MD        \"This note has been constructed using a voice recognition system.Therefore there may be syntax, spelling, and/or grammatical errors. Please call if you have any questions. \"  "

## 2024-11-01 ENCOUNTER — OFFICE VISIT (OUTPATIENT)
Dept: FAMILY MEDICINE CLINIC | Facility: CLINIC | Age: 24
End: 2024-11-01
Payer: COMMERCIAL

## 2024-11-01 VITALS
HEART RATE: 105 BPM | SYSTOLIC BLOOD PRESSURE: 100 MMHG | OXYGEN SATURATION: 98 % | DIASTOLIC BLOOD PRESSURE: 56 MMHG | HEIGHT: 62 IN | WEIGHT: 106.4 LBS | BODY MASS INDEX: 19.58 KG/M2

## 2024-11-01 DIAGNOSIS — E55.9 VITAMIN D DEFICIENCY: ICD-10-CM

## 2024-11-01 DIAGNOSIS — E04.1 THYROID NODULE: ICD-10-CM

## 2024-11-01 DIAGNOSIS — J34.2 NASAL SEPTAL DEVIATION: ICD-10-CM

## 2024-11-01 DIAGNOSIS — R06.83 SNORING: Primary | ICD-10-CM

## 2024-11-01 DIAGNOSIS — E05.00 GRAVES DISEASE: ICD-10-CM

## 2024-11-01 PROCEDURE — 99214 OFFICE O/P EST MOD 30 MIN: CPT | Performed by: INTERNAL MEDICINE

## 2024-11-01 NOTE — ASSESSMENT & PLAN NOTE
Endocrinologist note appreciated patient is fluctuating both clinically and biochemically with the thyroid still has goiter and thyromegaly currently patient is taking methimazole 5 mg daily we will continue with the same follow-up with the endocrinologist with repeat labs and also thyroid ultrasound.

## 2024-11-01 NOTE — ASSESSMENT & PLAN NOTE
Patient status post surgery for septal deviation 8 months back I reviewed the reports from ENT she has seen recently with recommended Flonase nasal spray for 12 weeks however patient has used for few weeks and had to stop it she did not want to get dependent on that currently feeling okay

## 2024-11-01 NOTE — ASSESSMENT & PLAN NOTE
Patient with thyroid nodule seen on the ultrasound done in 2023 number it has shown heterogeneous gland with generalized hyperemia and thyromegaly suggesting Graves' disease patient to have repeat ultrasound done in couple of months.

## 2024-11-01 NOTE — ASSESSMENT & PLAN NOTE
As mentioned in the history of present illness patient is snoring with tired feeling when she wakes up in the morning.  Daytime also she feels very quickly she can go to sleep.  No apneic spells were noted.  I reviewed the reports from the [facility] physician reviewed the referral for the same for getting a sleep study done.

## 2024-11-06 DIAGNOSIS — E05.00 GRAVES DISEASE: Primary | ICD-10-CM

## 2024-11-06 RX ORDER — METHIMAZOLE 5 MG/1
TABLET ORAL
Qty: 30 TABLET | Refills: 1 | Status: SHIPPED | OUTPATIENT
Start: 2024-11-06

## 2024-11-21 DIAGNOSIS — E05.00 GRAVES DISEASE: ICD-10-CM

## 2024-11-22 RX ORDER — METHIMAZOLE 5 MG/1
2.5 TABLET ORAL DAILY
Qty: 45 TABLET | Refills: 2 | Status: SHIPPED | OUTPATIENT
Start: 2024-11-22

## 2024-12-19 ENCOUNTER — OFFICE VISIT (OUTPATIENT)
Dept: SLEEP CENTER | Facility: CLINIC | Age: 24
End: 2024-12-19
Payer: COMMERCIAL

## 2024-12-19 VITALS
SYSTOLIC BLOOD PRESSURE: 107 MMHG | WEIGHT: 105 LBS | HEIGHT: 61 IN | BODY MASS INDEX: 19.83 KG/M2 | DIASTOLIC BLOOD PRESSURE: 71 MMHG | HEART RATE: 82 BPM | OXYGEN SATURATION: 99 %

## 2024-12-19 DIAGNOSIS — R06.83 SNORING: Primary | ICD-10-CM

## 2024-12-19 DIAGNOSIS — E05.00 GRAVES DISEASE: ICD-10-CM

## 2024-12-19 DIAGNOSIS — J34.2 NASAL SEPTAL DEVIATION: ICD-10-CM

## 2024-12-19 PROCEDURE — 99203 OFFICE O/P NEW LOW 30 MIN: CPT | Performed by: STUDENT IN AN ORGANIZED HEALTH CARE EDUCATION/TRAINING PROGRAM
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
## 2024-12-19 NOTE — ASSESSMENT & PLAN NOTE
History of prior nasal septal deviation s/p septoplasty earlier this year.  She reports a very mild improvement in her nasal congestion as well as breathing symptoms.  We reviewed the importance of the nose, and nasal breathing for sleep.  Orders:    Home Study; Future

## 2024-12-19 NOTE — ASSESSMENT & PLAN NOTE
She has a history of Graves' disease continued on methimazole.  She is following regularly with endocrinology.  She denied prior thyroid surgery.  We reviewed impact of thyroid hormone and thyroid hormone fluctuations on sleep.
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
## 2024-12-19 NOTE — PATIENT INSTRUCTIONS
"- A sleep study was ordered for you. It can be an in lab sleep study or a portable at home sleep study. It depends on what insurance approves.  Some insurances will only cover home sleep studies unless you have significant medical conditions like stroke or heart attack within the last 6 months, severe COPD, or the use of supplemental oxygen.    - The order goes electronically to the sleep center staff.    - The sleep center will get approval from your insurance and then will call you to schedule the sleep study.    - If you do not hear from the sleep center in 1 week, please call us to check the status of your order.    - There are different locations to get sleep study done.    - Please make sure you know what is the copay associated with your sleep study. Approval does not mean coverage.     - Once your sleep study is done, it can take up to 2 weeks to get results (depending on the volume).    - A follow up appointment to discuss sleep study results is required in most cases. On that visit, we will discuss results and treatment options.    - If your sleep study shows severe sleep apnea please expect contact from the sleep center. We will try to expedite your follow up appointment.    - The best way to communicate with us in through [facility] My [facility]. Make sure your account is active.    - Once you start CPAP therapy, it is mandatory by insurance, to have a follow up appointment with us within 31-90 days of getting CPAP.     Patient Education     Sleep apnea in adults   The Basics   Written by the doctors and editors at [facility]   What is sleep apnea? -- Sleep apnea is a condition that makes you stop breathing for short periods while you are asleep. There are 2 types of sleep apnea. One is called \"obstructive sleep apnea.\" The other is called \"central sleep apnea.\"  In obstructive sleep apnea, you stop breathing because your throat narrows or closes (figure 1). In central sleep apnea, you stop breathing because your " "brain does not send the right signals to your muscles to make you breathe. When people talk about sleep apnea, they are usually referring to obstructive sleep apnea, which is what this article is about.  People with sleep apnea do not know that they stop breathing when they are asleep. But they do sometimes wake up startled or gasping for breath. They also often hear from loved ones that they snore.  What are the symptoms of sleep apnea? -- The main symptoms of sleep apnea are loud snoring, tiredness, and daytime sleepiness. Other symptoms can include:   Restless sleep   Waking up choking or gasping   Morning headaches, dry mouth, or sore throat   Waking up often to urinate   Waking up feeling unrested or groggy   Trouble thinking clearly or remembering things  Some people with sleep apnea don't have symptoms, or don't realize that they have them.  Should I see a doctor or nurse? -- Yes. If you think that you might have sleep apnea, see your doctor.  Is there a test for sleep apnea? -- Yes. First, your doctor or nurse will ask about your symptoms. If you have a bed partner, they might also ask that person if you snore or gasp in your sleep. If the doctor or nurse suspects that you have sleep apnea, they might send you for a \"sleep study.\"  Sleep studies can sometimes be done at home, but they are usually done in a sleep lab. For the study, you spend the night in the lab, and you are hooked up to different machines that monitor your heart rate, breathing, and other body functions. The results of the test tell your doctor or nurse if you have the disorder.  Is there anything I can do on my own to help my sleep apnea? -- Yes. Some things that might help:   Try to avoid sleeping on your back, if possible. This might help some people.   Lose weight, if you have excess body weight.   Get regular physical activity. This might help you lose weight. But even if it doesn't, being active is good for your health.   Avoid " "alcohol, especially in the evening. Alcohol can make sleep apnea worse.  How is sleep apnea treated? -- Treatment can include:   Weight loss - As mentioned above, weight loss can help if you have excess weight or obesity. But losing weight can be challenging, and it takes time to lose enough weight to help with your sleep apnea. Most people need other treatment while they work on losing weight.   CPAP - The most effective treatment for sleep apnea is a device that keeps your airway open while you sleep. Treatment with this device is called \"continuous positive airway pressure\" (\"CPAP\"). People getting CPAP wear a face mask at night that keeps them breathing (figure 2).  If your doctor or nurse recommends a CPAP machine, be patient about using it. The mask might seem uncomfortable to wear at first, and the machine might seem noisy, but using the machine can really help you. People with sleep apnea who use a CPAP machine feel more rested and generally feel better.  If CPAP does not work, your doctor might suggest other treatment. Options might include:   An oral device - This is a device that you wear in your mouth. It is called an \"oral appliance\" or \"mandibular advancement device.\" It helps keep your airway open while you sleep.   Hypoglossal nerve stimulation - This involves a procedure to implant a small device into your chest. The device has a wire that connects to the nerve under your tongue. While you are sleeping, it sends an electrical signal that causes the tongue to push forward. This helps open up your airway.   Surgery to widen your airway - This might involve removing your tonsils or other tissue that blocks the airway.  Is sleep apnea dangerous? -- It can be. Risks include:   Accidents - People with sleep apnea do not get good-quality sleep, so they are often tired and not alert. This puts them at risk for car accidents and other types of accidents.   Other health problems - Studies show that people " with sleep apnea are more likely than others to have high blood pressure, heart attacks, and other serious heart problems. Some people also have mood changes or depression.  In people with severe sleep apnea, getting treated (for example, with CPAP) can help lower these risks.  All topics are updated as new evidence becomes available and our peer review process is complete.  This topic retrieved from [employer] on: Feb 28, 2024.  Topic 44052 Version 18.0  Release: 32.2.4 - C32.58  © 2024 UpToDate, Inc. and/or its affiliates. All rights reserved.  figure 1: Airway in a person with sleep apnea     Normally, when a person sleeps, the airway remains open, and air can pass from the nose and mouth to the lungs. In a person with sleep apnea, parts of the throat and mouth drop into the airway and block off the flow of air. This can cause loud snoring and interrupt breathing for short periods.  Graphic 67883 Version 6.0  figure 2: Continuous positive airway pressure (CPAP) for sleep apnea     The CPAP mask gently blows air into your nose while you sleep. It puts just enough pressure on your airway to keep it from closing. The mask in this picture fits over just the nose. Other CPAP devices have masks that fit over the nose and mouth.  Graphic 29828 Version 5.0  Consumer Information Use and Disclaimer   Disclaimer: This generalized information is a limited summary of diagnosis, treatment, and/or medication information. It is not meant to be comprehensive and should be used as a tool to help the user understand and/or assess potential diagnostic and treatment options. It does NOT include all information about conditions, treatments, medications, side effects, or risks that may apply to a specific patient. It is not intended to be medical advice or a substitute for the medical advice, diagnosis, or treatment of a health care provider based on the health care provider's examination and assessment of a patient's specific and unique  circumstances. Patients must speak with a health care provider for complete information about their health, medical questions, and treatment options, including any risks or benefits regarding use of medications. This information does not endorse any treatments or medications as safe, effective, or approved for treating a specific patient. UpToDate, Inc. and its affiliates disclaim any warranty or liability relating to this information or the use thereof.The use of this information is governed by the Terms of Use, available at https://www.woltersAmvonauwer.com/en/know/clinical-effectiveness-terms. 2024© UpToDate, Inc. and its affiliates and/or licensors. All rights reserved.  Copyright   © 2024 UpToDate, Inc. and/or its affiliates. All rights reserved.

## 2024-12-22 ENCOUNTER — HOSPITAL ENCOUNTER (OUTPATIENT)
Dept: ULTRASOUND IMAGING | Facility: HOSPITAL | Age: 24
Discharge: HOME | End: 2024-12-22
Payer: COMMERCIAL

## 2024-12-22 DIAGNOSIS — E04.1 THYROID NODULE: ICD-10-CM

## 2024-12-22 DIAGNOSIS — E05.00 GRAVES DISEASE: ICD-10-CM

## 2024-12-22 PROCEDURE — 76536 US EXAM OF HEAD AND NECK: CPT

## 2025-01-07 ENCOUNTER — RESULTS FOLLOW-UP (OUTPATIENT)
Dept: ENDOCRINOLOGY | Facility: CLINIC | Age: 25
End: 2025-01-07

## 2025-01-28 LAB
T3 SERPL-MCNC: 152 NG/DL (ref 76–181)
T4 FREE SERPL-MCNC: 2 NG/DL (ref 0.8–1.8)
TSH SERPL-ACNC: 0.02 MIU/L

## 2025-01-31 ENCOUNTER — TELEPHONE (OUTPATIENT)
Dept: ENDOCRINOLOGY | Facility: CLINIC | Age: 25
End: 2025-01-31

## 2025-01-31 NOTE — TELEPHONE ENCOUNTER
LVM for patient to reschedule upcoming appointment with Dr. Mejia on 2/14/25 due to the provider being out of the office.     Please reschedule with him or Janay Brice. Thank you!

## 2025-02-14 ENCOUNTER — OFFICE VISIT (OUTPATIENT)
Dept: ENDOCRINOLOGY | Facility: CLINIC | Age: 25
End: 2025-02-14
Payer: COMMERCIAL

## 2025-02-14 ENCOUNTER — HOSPITAL ENCOUNTER (OUTPATIENT)
Dept: SLEEP CENTER | Facility: CLINIC | Age: 25
Discharge: HOME | End: 2025-02-14
Payer: COMMERCIAL

## 2025-02-14 VITALS
HEIGHT: 61 IN | BODY MASS INDEX: 18.88 KG/M2 | DIASTOLIC BLOOD PRESSURE: 60 MMHG | OXYGEN SATURATION: 98 % | WEIGHT: 100 LBS | HEART RATE: 94 BPM | TEMPERATURE: 98.2 F | SYSTOLIC BLOOD PRESSURE: 82 MMHG

## 2025-02-14 DIAGNOSIS — J34.2 NASAL SEPTAL DEVIATION: ICD-10-CM

## 2025-02-14 DIAGNOSIS — E55.9 VITAMIN D DEFICIENCY: ICD-10-CM

## 2025-02-14 DIAGNOSIS — E05.00 GRAVES DISEASE: Primary | ICD-10-CM

## 2025-02-14 DIAGNOSIS — R06.83 SNORING: ICD-10-CM

## 2025-02-14 DIAGNOSIS — E04.1 THYROID NODULE: ICD-10-CM

## 2025-02-14 PROCEDURE — 99214 OFFICE O/P EST MOD 30 MIN: CPT | Performed by: INTERNAL MEDICINE

## 2025-02-14 PROCEDURE — G0399 HOME SLEEP TEST/TYPE 3 PORTA: HCPCS

## 2025-02-14 RX ORDER — ATOMOXETINE 25 MG/1
CAPSULE ORAL DAILY
COMMUNITY
Start: 2024-12-05

## 2025-02-14 RX ORDER — METHIMAZOLE 5 MG/1
5 TABLET ORAL DAILY
Qty: 90 TABLET | Refills: 1 | Status: SHIPPED | OUTPATIENT
Start: 2025-02-14

## 2025-02-14 NOTE — ASSESSMENT & PLAN NOTE
She has a waxing and waning course of Graves disease with hyper-response to methimazole dose adjustments.    Today we reviewed all labs since 6/2024 and the associated dose adjustments that we made. It seems like her steady dose would be in the 2.5-5mg daily dose.    She is currently using 10mg daily -expect TSH to be elevated. We agreed to repeat labs today.    We also reviewed role of FLORES ablation which is reasonable but will wait for TSI today.    Follow up in 2-3 months.

## 2025-02-14 NOTE — PROGRESS NOTES
"    Follow-up Patient Progress Note    CC: Hyperthyroidism     History of Present Illness:   24 yr female with recently diagnosed hyperthyroidism with elevated TSI and thyroid nodule. Last visit was 10/22/24.     She lost 4 lbs. She continues to have significant fluctuations with the TSH and fT4.     She is now on methimazole 10mg PO daily since 10/9/24.  TSI was  elevated.     She reports occasional palpitation but mostly asymptomatic.     FHx: MGM and aunt had thyroid dysfunction.     11/10/23 US thyroid: Nodule #1. RIGHT midgland nodule 0.7 x 0.3 x 0.5 cm.  TR 4   12/22/24 US thyroid: unremarkable.    Physical Exam:  Body mass index is 18.89 kg/m².  BP (!) 82/60 (BP Location: Left arm, Patient Position: Sitting, Cuff Size: Standard)   Pulse 94   Temp 98.2 °F (36.8 °C) (Tympanic)   Ht 5' 1\" (1.549 m)   Wt 45.4 kg (100 lb)   SpO2 98%   BMI 18.89 kg/m²    Vitals:    02/14/25 0956   Weight: 45.4 kg (100 lb)        Physical Exam  Constitutional:       General: She is not in acute distress.     Appearance: She is well-developed.   HENT:      Head: Normocephalic and atraumatic.      Nose: Nose normal.   Eyes:      Conjunctiva/sclera: Conjunctivae normal.   Pulmonary:      Effort: Pulmonary effort is normal.   Abdominal:      General: There is no distension.   Musculoskeletal:      Cervical back: Normal range of motion and neck supple.   Skin:     Findings: No rash.      Comments: No icterus   Neurological:      Mental Status: She is alert and oriented to person, place, and time.       Labs:   No results found for: \"HGBA1C\"    Lab Results   Component Value Date    PCM0JVCHHYMF <0.010 (L) 12/07/2023    TSH 0.02 (L) 01/27/2025    F1BKJDC 152 01/27/2025       No results found for: \"CREATININE\", \"BUN\", \"NA\", \"K\", \"CL\", \"CO2\"  No results found for: \"EGFR\"    No results found for: \"ALT\", \"AST\", \"GGT\", \"ALKPHOS\", \"BILITOT\"    No results found for: \"CHOLESTEROL\"  No results found for: \"HDL\"  No results found for: " "\"TRIG\"  No results found for: \"NONHDLC\"      Assessment/Plan:    1. Graves disease  Assessment & Plan:  She has a waxing and waning course of Graves disease with hyper-response to methimazole dose adjustments.    Today we reviewed all labs since 6/2024 and the associated dose adjustments that we made. It seems like her steady dose would be in the 2.5-5mg daily dose.    She is currently using 10mg daily -expect TSH to be elevated. We agreed to repeat labs today.    We also reviewed role of FLORES ablation which is reasonable but will wait for TSI today.    Follow up in 2-3 months.  Orders:  -     methimazole (TAPAZOLE) 5 mg tablet; Take 1 tablet (5 mg total) by mouth daily  -     T3; Future  -     T4, free; Future  -     TSH, 3rd generation; Future  -     T3  -     T4, free  -     TSH, 3rd generation  -     Thyroid stimulating immunoglobulin; Future  -     Thyroid stimulating immunoglobulin  -     NM therapy ablation hyperthyroid; Future; Expected date: 02/14/2025  2. Thyroid nodule  Assessment & Plan:  Prior visible thyroid nodule is no longer visible on recent Thyroid US. No further surveillance is needed.  3. Vitamin D deficiency        I have spent a total time of  minutes on 02/14/25 in caring for this patient including greater than 50% of this time was spent in counseling/coordination of care as listed above.       Discussed with the patient and all questioned fully answered. She will contact me with concerns.    Fouzia Mejia"

## 2025-02-14 NOTE — PROGRESS NOTES
Home Sleep Study Documentation    HOME STUDY DEVICE: Noxturnal no                                           Gisselle G3 yes device # 26      Pre-Sleep Home Study:    Set-up and instructions performed by: Kimberly    Technician performed demonstration for Patient: yes    Return demonstration performed by Patient: yes    Written instructions provided to Patient: yes    Patient signed consent form: yes        Post-Sleep Home Study:    Additional comments by Patient:       Home Sleep Study Failed:no:    Failure reason: N/A    Reported or Detected: N/A    Scored by: MARRY Klein

## 2025-02-14 NOTE — ASSESSMENT & PLAN NOTE
Prior visible thyroid nodule is no longer visible on recent Thyroid US. No further surveillance is needed.

## 2025-02-15 LAB
T3 SERPL-MCNC: 95 NG/DL (ref 76–181)
T4 FREE SERPL-MCNC: 1.4 NG/DL (ref 0.8–1.8)
TSH SERPL-ACNC: 0.02 MIU/L

## 2025-02-18 LAB
T3 SERPL-MCNC: 95 NG/DL (ref 76–181)
T4 FREE SERPL-MCNC: 1.4 NG/DL (ref 0.8–1.8)
TSH SERPL-ACNC: 0.02 MIU/L
TSI SER-ACNC: 311 % BASELINE

## 2025-02-19 PROBLEM — G47.33 OSA (OBSTRUCTIVE SLEEP APNEA): Status: ACTIVE | Noted: 2025-02-19

## 2025-02-20 ENCOUNTER — DOCUMENTATION (OUTPATIENT)
Dept: SLEEP CENTER | Facility: CLINIC | Age: 25
End: 2025-02-20

## 2025-02-20 PROCEDURE — 95806 SLEEP STUDY UNATT&RESP EFFT: CPT | Performed by: STUDENT IN AN ORGANIZED HEALTH CARE EDUCATION/TRAINING PROGRAM

## 2025-02-20 NOTE — PROGRESS NOTES
Patient with recent home sleep study which revealed mild obstructive sleep apnea.    I have messaged clinical sleep pool regarding study results.

## 2025-02-24 DIAGNOSIS — E05.00 GRAVES DISEASE: Primary | ICD-10-CM

## 2025-02-26 ENCOUNTER — TELEPHONE (OUTPATIENT)
Dept: SLEEP CENTER | Facility: CLINIC | Age: 25
End: 2025-02-26

## 2025-02-26 NOTE — TELEPHONE ENCOUNTER
Per Dr. David:  Patient with recent home sleep study which revealed mild obstructive sleep apnea.     I have messaged clinical sleep pool regarding study results.  ----------------------------------------------      Patient needs follow up appointment with Dr. David.      Called patient and left message advising I will send a [employer] message with the sleep study results and next steps.  Provided sleep center number([phone]) to call if any questions.

## 2025-03-01 ENCOUNTER — RESULTS FOLLOW-UP (OUTPATIENT)
Dept: ENDOCRINOLOGY | Facility: CLINIC | Age: 25
End: 2025-03-01

## 2025-03-12 ENCOUNTER — OFFICE VISIT (OUTPATIENT)
Dept: SLEEP CENTER | Facility: CLINIC | Age: 25
End: 2025-03-12
Payer: COMMERCIAL

## 2025-03-12 VITALS
DIASTOLIC BLOOD PRESSURE: 64 MMHG | HEART RATE: 89 BPM | BODY MASS INDEX: 18.88 KG/M2 | HEIGHT: 61 IN | OXYGEN SATURATION: 98 % | WEIGHT: 100 LBS | SYSTOLIC BLOOD PRESSURE: 106 MMHG

## 2025-03-12 DIAGNOSIS — J34.2 NASAL SEPTAL DEVIATION: ICD-10-CM

## 2025-03-12 DIAGNOSIS — G47.33 MILD OBSTRUCTIVE SLEEP APNEA: Primary | ICD-10-CM

## 2025-03-12 DIAGNOSIS — E05.00 GRAVES DISEASE: ICD-10-CM

## 2025-03-12 PROCEDURE — 99214 OFFICE O/P EST MOD 30 MIN: CPT | Performed by: STUDENT IN AN ORGANIZED HEALTH CARE EDUCATION/TRAINING PROGRAM

## 2025-03-12 RX ORDER — ATOMOXETINE 60 MG/1
CAPSULE ORAL
COMMUNITY
Start: 2025-02-20

## 2025-03-12 NOTE — ASSESSMENT & PLAN NOTE
Patient with a history of Graves' disease continued on methimazole.  She continues to follow regularly with endocrinology.  She is undergoing a further evaluation for possible thyroid surgery and intervention.  We reviewed impact of thyroid hormone and thyroid hormone fluctuations on sleep.
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
## 2025-03-12 NOTE — PATIENT INSTRUCTIONS
"Oral Appliance/Mandibular Advancement Device for Obstructive Sleep Apnea  -Go to [employer].com and search for \"mandibular advancement device\" or \"oral appliance for sleep apnea.\"  - Most options are between $.   -These devices pull the lower jaw forward to get the tongue out of the way of the upper airway and to treat obstructive sleep apnea  -Use with all periods of sleep to treat sleep disordered breathing  -We may repeat a sleep study with the oral appliance in place to assure proper treatment of obstructive sleep apnea    \"Difiney\"   https://www.amazon.com/Difiney-Advanced-Anti-Snoring-Device/dp/[email or web address]/ref=sxin_16_sbv_search_btf?content-id=[email or web address]&cv_ct_cx=oral+appliance+for+sleep+apnea&keywords=oral+appliance+for+sleep+apnea&pd_rd_i=[id number]&pd_rd_r=[id number]&pd_rd_w=lOcKZ&pd_rd_wg=bkgg8&pf_rd_p=[id number]&pf_rd_r=[id number]&pfj=[id number]&sbo=RZvfv%2F%[id number]%3D%3D&sr=[id number]  "

## 2025-03-12 NOTE — ASSESSMENT & PLAN NOTE
Patient with a prior history of nasal septal deviation status post septoplasty in 2024.  She continues to report only mild improvement in her nasal congestion and breathing symptoms.  Reviewed the importance of control of nasal sinus congestion on sleep.

## 2025-03-13 LAB
T3 SERPL-MCNC: 112 NG/DL (ref 76–181)
T4 FREE SERPL-MCNC: 1.2 NG/DL (ref 0.8–1.8)
TSH SERPL-ACNC: 3.84 MIU/L

## 2025-03-18 ENCOUNTER — HOSPITAL ENCOUNTER (OUTPATIENT)
Dept: RADIOLOGY | Age: 25
Discharge: HOME | End: 2025-03-18

## 2025-03-18 DIAGNOSIS — E05.00 GRAVES' DISEASE: ICD-10-CM

## 2025-03-21 DIAGNOSIS — E05.00 GRAVES DISEASE: ICD-10-CM

## 2025-03-21 RX ORDER — METHIMAZOLE 5 MG/1
TABLET ORAL
Start: 2025-03-21

## 2025-04-06 LAB
T3 SERPL-MCNC: 100 NG/DL (ref 76–181)
T4 FREE SERPL-MCNC: 1.1 NG/DL (ref 0.8–1.8)
TSH SERPL-ACNC: 2.02 MIU/L

## 2025-04-17 ENCOUNTER — RESULTS FOLLOW-UP (OUTPATIENT)
Dept: OTHER | Facility: HOSPITAL | Age: 25
End: 2025-04-17

## 2025-04-17 DIAGNOSIS — E05.00 GRAVES DISEASE: Primary | ICD-10-CM

## 2025-04-22 LAB
T3 SERPL-MCNC: 93 NG/DL (ref 76–181)
T4 FREE SERPL-MCNC: 1.1 NG/DL (ref 0.8–1.8)
TSH SERPL-ACNC: 3 MIU/L

## 2025-04-25 ENCOUNTER — OFFICE VISIT (OUTPATIENT)
Dept: ENDOCRINOLOGY | Facility: CLINIC | Age: 25
End: 2025-04-25
Payer: COMMERCIAL

## 2025-04-25 VITALS
BODY MASS INDEX: 18.31 KG/M2 | TEMPERATURE: 97.9 F | WEIGHT: 97 LBS | RESPIRATION RATE: 12 BRPM | SYSTOLIC BLOOD PRESSURE: 98 MMHG | OXYGEN SATURATION: 96 % | HEIGHT: 61 IN | HEART RATE: 93 BPM | DIASTOLIC BLOOD PRESSURE: 64 MMHG

## 2025-04-25 DIAGNOSIS — E05.00 GRAVES DISEASE: Primary | ICD-10-CM

## 2025-04-25 DIAGNOSIS — E55.9 VITAMIN D DEFICIENCY: ICD-10-CM

## 2025-04-25 PROBLEM — E04.1 THYROID NODULE: Status: RESOLVED | Noted: 2024-02-05 | Resolved: 2025-04-25

## 2025-04-25 PROCEDURE — 99214 OFFICE O/P EST MOD 30 MIN: CPT | Performed by: INTERNAL MEDICINE

## 2025-04-25 RX ORDER — METHIMAZOLE 5 MG/1
TABLET ORAL
Qty: 45 TABLET | Refills: 1 | Status: SHIPPED | OUTPATIENT
Start: 2025-04-25

## 2025-04-25 NOTE — ASSESSMENT & PLAN NOTE
She now seems clinically and biochemically euthyroid on methimazole 2.5mg daily.  In past, She has a waxing and waning course of Graves disease with hyper-response to methimazole dose adjustments.    Today we reviewed all labs since last visit and we agreed to continue  2.5mg daily dose.    We agreed to repeat labs including TSI. Since TSH is now normal, hope TSI would be undetectable.  Follow up in 6 months.

## 2025-04-25 NOTE — PROGRESS NOTES
"    Follow-up Patient Progress Note      CC: Hyperthyroidism     History of Present Illness:   24 yr female with recently diagnosed hyperthyroidism with elevated TSI and thyroid nodule. Last visit was 2/14/25.     She lost 3 lbs. She continues to have significant fluctuations with the TSH and fT4.     She is now on methimazole 2.5mg PO daily since 10/9/24.  TSI was  elevated.     She reports occasional palpitation but mostly asymptomatic.     FHx: MGM and aunt had thyroid dysfunction.     11/10/23 US thyroid: Nodule #1. RIGHT midgland nodule 0.7 x 0.3 x 0.5 cm.  TR 4   12/22/24 US thyroid: unremarkable.      Physical Exam:  Body mass index is 18.33 kg/m².  BP 98/64 (BP Location: Left arm, Patient Position: Sitting)   Pulse 93   Temp 97.9 °F (36.6 °C) (Tympanic)   Resp 12   Ht 5' 1\" (1.549 m)   Wt 44 kg (97 lb)   SpO2 96%   BMI 18.33 kg/m²    Vitals:    04/25/25 1445   Weight: 44 kg (97 lb)        Physical Exam  Constitutional:       General: She is not in acute distress.     Appearance: She is well-developed.   HENT:      Head: Normocephalic and atraumatic.      Nose: Nose normal.   Eyes:      Conjunctiva/sclera: Conjunctivae normal.   Pulmonary:      Effort: Pulmonary effort is normal.   Abdominal:      General: There is no distension.   Musculoskeletal:      Cervical back: Normal range of motion and neck supple.   Skin:     Findings: No rash.      Comments: No icterus   Neurological:      Mental Status: She is alert and oriented to person, place, and time.         Labs:   No results found for: \"HGBA1C\"    Lab Results   Component Value Date    [id number] <0.010 (L) 12/07/2023    TSH 3.00 04/21/2025    O6CTCPZ 93 04/21/2025       No results found for: \"CREATININE\", \"BUN\", \"NA\", \"K\", \"CL\", \"CO2\"  No results found for: \"EGFR\"    No results found for: \"ALT\", \"AST\", \"GGT\", \"ALKPHOS\", \"BILITOT\"    No results found for: \"CHOLESTEROL\"  No results found for: \"HDL\"  No results found for: \"TRIG\"  No results found " "for: \"NONHDLC\"      Assessment/Plan:    1. Graves disease  Assessment & Plan:  She now seems clinically and biochemically euthyroid on methimazole 2.5mg daily.  In past, She has a waxing and waning course of Graves disease with hyper-response to methimazole dose adjustments.    Today we reviewed all labs since last visit and we agreed to continue  2.5mg daily dose.    We agreed to repeat labs including TSI. Since TSH is now normal, hope TSI would be undetectable.  Follow up in 6 months.  Orders:  -     T4, free; Future  -     TSH, 3rd generation; Future  -     T3; Future  -     T4, free  -     TSH, 3rd generation  -     T3  -     T4, free; Future; Expected date: 07/25/2025  -     TSH, 3rd generation; Future; Expected date: 07/25/2025  -     T3; Future; Expected date: 07/25/2025  -     T4, free  -     TSH, 3rd generation  -     T3  -     methimazole (TAPAZOLE) 5 mg tablet; Use 1 tab and 0.5 tab every other day.  -     Thyroid stimulating immunoglobulin; Future  -     Thyroid stimulating immunoglobulin  2. Vitamin D deficiency  Assessment & Plan:  Take vit D3 200IU daily OTC.        I have spent a total time of  minutes on 04/25/25 in caring for this patient including greater than 50% of this time was spent in counseling/coordination of care as listed above.       Discussed with the patient and all questioned fully answered. She will contact me with concerns.    Fouzia Mejia  "

## 2025-06-02 ENCOUNTER — OFFICE VISIT (OUTPATIENT)
Dept: SLEEP CENTER | Facility: CLINIC | Age: 25
End: 2025-06-02
Payer: COMMERCIAL

## 2025-06-02 VITALS
DIASTOLIC BLOOD PRESSURE: 68 MMHG | WEIGHT: 100 LBS | SYSTOLIC BLOOD PRESSURE: 100 MMHG | OXYGEN SATURATION: 91 % | BODY MASS INDEX: 18.88 KG/M2 | HEIGHT: 61 IN | HEART RATE: 90 BPM

## 2025-06-02 DIAGNOSIS — G47.19 EXCESSIVE DAYTIME SLEEPINESS: ICD-10-CM

## 2025-06-02 DIAGNOSIS — E05.00 GRAVES DISEASE: ICD-10-CM

## 2025-06-02 DIAGNOSIS — G47.33 MILD OBSTRUCTIVE SLEEP APNEA: Primary | ICD-10-CM

## 2025-06-02 PROCEDURE — 99214 OFFICE O/P EST MOD 30 MIN: CPT | Performed by: STUDENT IN AN ORGANIZED HEALTH CARE EDUCATION/TRAINING PROGRAM

## 2025-06-02 NOTE — ASSESSMENT & PLAN NOTE
With history of Graves' disease.  She is continued on methimazole.  She continues to follow regularly with endocrinology.  We reviewed and discussed impact of thyroid hormone fluctuations on sleep and daytime sleepiness.  She will continue regular follow-up with endocrinology.

## 2025-06-02 NOTE — PATIENT INSTRUCTIONS
MSLT Overview: This test is an overnight sleep study followed by a daytime sleep study to assess your sleepiness.     Here are some important summary points.  Please also read the detailed description below    - Duration: The test will last from the evening of the overnight study through approximately 6 PM the following day.  - Clothing: Wear comfortable sleepwear for the overnight test; bring daytime comfortable clothes.  - Medications: Follow the medication plan we discussed before the test. Contact me as soon as possible if you cannot follow it or have concerns.  - Sleep Preparation: Ensure adequate sleep (at least 7 hours) the week before the test to avoid sleep deprivation.  - Routine: Maintain your regular sleep-wake routine the week before the test.  - Avoid Naps: Do not nap the day before and the day of the test  - Caffeine and Marijuana: Taper off caffeine one week before. Avoid marijuana for at least two weeks before the test as this can affect accuracy of the results.  - Health Changes: Report any new medications or significant health changes before the test.  - Food and Essentials: Bring food and drinks for the day; you'll stay in the lab all day. Bring personal items like a phone , book, or laptop.  Also bring medications that you take at night or during the day as well as an up to date medication list.  - Insurance and ID: Bring insurance information and identification.  - Rescheduling: Contact me immediately if you cannot commit to the test or have concerns.    ---    Detailed Instructions:  We have scheduled you for a Multiple Sleep Latency Test (MSLT) to assess your level of sleepiness. This process involves two parts:    1. Overnight Sleep Study: You will spend the night in the sleep lab, where we will monitor various factors like sleep stages, oxygen levels, breathing, and heart rate. If you use CPAP therapy, it will be applied during this test. Provided there are no significant issues and  sufficient sleep is recorded, you will proceed to the daytime sleep study.    2. Daytime Sleep Study: You will stay in the sleep lab all day. Every two hours, you will be asked to lie down and attempt a nap. We will measure how quickly you fall asleep and which sleep stages are achieved. There will be five nap attempts throughout the day, and the test should conclude by 6 PM.      Preparation:  - Wear pajamas or other comfortable sleepwear for the overnight portion. After the overnight test, you can change into comfortable daytime clothes.  - Make sure we have discussed any medications you are taking, as many can affect the results of the test. Follow the medication plan we discussed, and do not stop or change any medications without consulting me first.(Unless an urgent change is suggested by another doctor)  Contact me if you cannot follow the plan or have concerns.  -Bring a medication list to the day of the test, also bring any medications that you routinely take at night or during the day      Important Considerations:  - Sleep Adequacy: It is crucial to avoid sleep deprivation in the week leading up to the test. Aim for at least seven hours of sleep each night, or more if you typically need it. If work, school, or travel may interfere with your sleep, please contact me to discuss possible adjustments.  - Avoid Naps- To ensure accurate results, avoid napping on the day before the test and on the day of the test until you are instructed to nap during the study.  - Caffeine, alcohol, nicotine, and Marijuana: Gradually reduce caffeine intake a week before the test. Minimize or avoid alcohol and nicotine in the days leading up to the test.  avoid marijuana for at least two weeks prior, as these substances can interfere with your sleep patterns and the accuracy of the test results.  - New Medications: Inform me of any new medications prescribed before the test.  - Health Changes: Report any significant health  "changes or pain that could affect your sleep before the test.    Day of the Test:  - Bring food and drinks for the day (breakfast and lunch). A microwave and refrigerator are available on-site. You will not be able to leave the lab during the daytime test.  - Consider bringing personal items such as a phone , book, or laptop for the day..    If you cannot commit to the test or have any concerns after reading these instructions, please contact me as soon as possible to discuss or reschedule.      Patient Education     Narcolepsy   The Basics   Written by the doctors and editors at [facility]   What is narcolepsy? -- Narcolepsy is a brain disorder that makes you feel sleepy most of the time. People with narcolepsy sometimes fall asleep all of a sudden, even when they don't expect to. They can even fall asleep while they are in the middle of activities, such as eating, talking, or driving.  People usually develop narcolepsy during their teens or early 20s. Some people get it earlier and others later. Once it starts, the disorder can make it hard to work, do schoolwork, or do other normal activities.  What are the symptoms of narcolepsy? -- The symptoms can include:   Feeling sleepy during the day   Falling asleep all of a sudden, often at inappropriate times - Some people call these \"sleep attacks.\"   Suddenly falling down, going limp, or feeling weak, especially when excited, angry, or laughing - The medical term for this is \"cataplexy.\"   Being unable to move or speak in the few moments right after waking or just before falling asleep   Seeing, feeling, or hearing things that are not really there in the few moments before falling asleep or right after waking up - This can be scary and feel very real.  People with narcolepsy often have trouble sleeping at night, even if they are tired. They might fall asleep easily but then wake up several times during the night.  Some people also have problems with depression or " "anxiety. Symptoms of depression include feeling sad most of the time, or losing interest in things that you used to like to do. Symptoms of anxiety include feeling worried most of the time.  Should I see a doctor or nurse? -- Yes. If you have symptoms of narcolepsy, see your doctor or nurse. The symptoms can be dangerous if they happen while you are driving or doing something that could lead to a fall or injury.  You should also talk to your doctor or nurse if you think that you might have depression or anxiety. There are treatments that can help.  Will I need tests? -- Yes. If your doctor or nurse suspects that you have narcolepsy, they might send you for a \"sleep study.\" For the study, you go to a sleep lab, where you are hooked up to different machines that monitor your heart rate, breathing, brain activity, and movements while you sleep at night. Several hours after the sleep study is done, another test is done in which the lights are dimmed and you are given privacy and asked to try napping several times.  People with narcolepsy have abnormal sleep patterns during naps and at night. These abnormal patterns can be detected during the studies.  How is narcolepsy treated? -- Narcolepsy is usually treated with behavior changes and medicines. People with the disorder should:   Avoid medicines that can cause sleepiness, such as some allergy medicines.   Take naps just before important events and at scheduled times during the day.   Keep a regular sleep schedule.   Try to get enough sleep at night.  People who are still very sleepy even if they make these changes can be treated with medicines to help them stay awake. These medicines can help, but even with treatment, people can still feel sleepy. That's why even people who are being treated have to be careful about the activities they do. Driving, for example, can be dangerous for some people with narcolepsy. Work with your doctor to make a plan that is safe for " you.  The medicines used to help people stay awake can sometimes cause high blood pressure, decreased appetite, and other problems. If your doctor prescribes you 1 of these medicines, make sure that you understand the risks.  People who have muscle weakness or go limp when they feel strong emotions can get medicines to help with this, too.  Is there anything I can do on my own to deal with narcolepsy? -- If you have narcolepsy, think about seeing a counselor and try to find support at work or school. This condition can make you feel sad, frustrated, and embarrassed. Plus, other people who do not understand the condition can sometimes treat you as if you are lazy or accuse you of avoiding things. All of this can be hard to deal with, so it can help to have someone to talk to.  All topics are updated as new evidence becomes available and our peer review process is complete.  This topic retrieved from [employer] on: Feb 26, 2024.  Topic 38788 Version 10.0  Release: 32.2.4 - C32.56  © 2024 UpToDate, Inc. and/or its affiliates. All rights reserved.  Consumer Information Use and Disclaimer   Disclaimer: This generalized information is a limited summary of diagnosis, treatment, and/or medication information. It is not meant to be comprehensive and should be used as a tool to help the user understand and/or assess potential diagnostic and treatment options. It does NOT include all information about conditions, treatments, medications, side effects, or risks that may apply to a specific patient. It is not intended to be medical advice or a substitute for the medical advice, diagnosis, or treatment of a health care provider based on the health care provider's examination and assessment of a patient's specific and unique circumstances. Patients must speak with a health care provider for complete information about their health, medical questions, and treatment options, including any risks or benefits regarding use of medications.  "This information does not endorse any treatments or medications as safe, effective, or approved for treating a specific patient. UpToDate, Inc. and its affiliates disclaim any warranty or liability relating to this information or the use thereof.The use of this information is governed by the Terms of Use, available at https://www.[employer].com/en/know/clinical-effectiveness-terms. 2024© UpToDate, Inc. and its affiliates and/or licensors. All rights reserved.  Copyright   © 2024 UpToDate, Inc. and/or its affiliates. All rights reserved.      Patient Education     Sleep apnea in adults   The Basics   Written by the doctors and editors at [employer]   What is sleep apnea? -- Sleep apnea is a condition that makes you stop breathing for short periods while you are asleep. There are 2 types of sleep apnea. One is called \"obstructive sleep apnea.\" The other is called \"central sleep apnea.\"  In obstructive sleep apnea, you stop breathing because your throat narrows or closes (figure 1). In central sleep apnea, you stop breathing because your brain does not send the right signals to your muscles to make you breathe. When people talk about sleep apnea, they are usually referring to obstructive sleep apnea, which is what this article is about.  People with sleep apnea do not know that they stop breathing when they are asleep. But they do sometimes wake up startled or gasping for breath. They also often hear from loved ones that they snore.  What are the symptoms of sleep apnea? -- The main symptoms of sleep apnea are loud snoring, tiredness, and daytime sleepiness. Other symptoms can include:   Restless sleep   Waking up choking or gasping   Morning headaches, dry mouth, or sore throat   Waking up often to urinate   Waking up feeling unrested or groggy   Trouble thinking clearly or remembering things  Some people with sleep apnea don't have symptoms, or don't realize that they have them.  Should I see a doctor or nurse? -- " "Yes. If you think that you might have sleep apnea, see your doctor.  Is there a test for sleep apnea? -- Yes. First, your doctor or nurse will ask about your symptoms. If you have a bed partner, they might also ask that person if you snore or gasp in your sleep. If the doctor or nurse suspects that you have sleep apnea, they might send you for a \"sleep study.\"  Sleep studies can sometimes be done at home, but they are usually done in a sleep lab. For the study, you spend the night in the lab, and you are hooked up to different machines that monitor your heart rate, breathing, and other body functions. The results of the test tell your doctor or nurse if you have the disorder.  Is there anything I can do on my own to help my sleep apnea? -- Yes. Some things that might help:   Try to avoid sleeping on your back, if possible. This might help some people.   Lose weight, if you have excess body weight.   Get regular physical activity. This might help you lose weight. But even if it doesn't, being active is good for your health.   Avoid alcohol, especially in the evening. Alcohol can make sleep apnea worse.  How is sleep apnea treated? -- Treatment can include:   Weight loss - As mentioned above, weight loss can help if you have excess weight or obesity. But losing weight can be challenging, and it takes time to lose enough weight to help with your sleep apnea. Most people need other treatment while they work on losing weight.   CPAP - The most effective treatment for sleep apnea is a device that keeps your airway open while you sleep. Treatment with this device is called \"continuous positive airway pressure\" (\"CPAP\"). People getting CPAP wear a face mask at night that keeps them breathing (figure 2).  If your doctor or nurse recommends a CPAP machine, be patient about using it. The mask might seem uncomfortable to wear at first, and the machine might seem noisy, but using the machine can really help you. People with " "sleep apnea who use a CPAP machine feel more rested and generally feel better.  If CPAP does not work, your doctor might suggest other treatment. Options might include:   An oral device - This is a device that you wear in your mouth. It is called an \"oral appliance\" or \"mandibular advancement device.\" It helps keep your airway open while you sleep.   Hypoglossal nerve stimulation - This involves a procedure to implant a small device into your chest. The device has a wire that connects to the nerve under your tongue. While you are sleeping, it sends an electrical signal that causes the tongue to push forward. This helps open up your airway.   Surgery to widen your airway - This might involve removing your tonsils or other tissue that blocks the airway.  Is sleep apnea dangerous? -- It can be. Risks include:   Accidents - People with sleep apnea do not get good-quality sleep, so they are often tired and not alert. This puts them at risk for car accidents and other types of accidents.   Other health problems - Studies show that people with sleep apnea are more likely than others to have high blood pressure, heart attacks, and other serious heart problems. Some people also have mood changes or depression.  In people with severe sleep apnea, getting treated (for example, with CPAP) can help lower these risks.  All topics are updated as new evidence becomes available and our peer review process is complete.  This topic retrieved from [employer] on: Feb 28, 2024.  Topic 59026 Version 18.0  Release: 32.2.4 - C32.58  © 2024 UpToDate, Inc. and/or its affiliates. All rights reserved.  figure 1: Airway in a person with sleep apnea     Normally, when a person sleeps, the airway remains open, and air can pass from the nose and mouth to the lungs. In a person with sleep apnea, parts of the throat and mouth drop into the airway and block off the flow of air. This can cause loud snoring and interrupt breathing for short " periods.  Graphic 91262 Version 6.0  figure 2: Continuous positive airway pressure (CPAP) for sleep apnea     The CPAP mask gently blows air into your nose while you sleep. It puts just enough pressure on your airway to keep it from closing. The mask in this picture fits over just the nose. Other CPAP devices have masks that fit over the nose and mouth.  Graphic 60762 Version 5.0  Consumer Information Use and Disclaimer   Disclaimer: This generalized information is a limited summary of diagnosis, treatment, and/or medication information. It is not meant to be comprehensive and should be used as a tool to help the user understand and/or assess potential diagnostic and treatment options. It does NOT include all information about conditions, treatments, medications, side effects, or risks that may apply to a specific patient. It is not intended to be medical advice or a substitute for the medical advice, diagnosis, or treatment of a health care provider based on the health care provider's examination and assessment of a patient's specific and unique circumstances. Patients must speak with a health care provider for complete information about their health, medical questions, and treatment options, including any risks or benefits regarding use of medications. This information does not endorse any treatments or medications as safe, effective, or approved for treating a specific patient. UpToDate, Inc. and its affiliates disclaim any warranty or liability relating to this information or the use thereof.The use of this information is governed by the Terms of Use, available at https://www.[employer].com/en/know/clinical-effectiveness-terms. 2024© UpToDate, Inc. and its affiliates and/or licensors. All rights reserved.  Copyright   © 2024 UpToDate, Inc. and/or its affiliates. All rights reserved.

## 2025-06-02 NOTE — PROGRESS NOTES
Name: Racquel Holguin      : 2000      MRN: [id number]  Encounter Provider: Aj David MD  Encounter Date: 2025   Encounter department: [facility]    :  Assessment & Plan  Mild obstructive sleep apnea  Mild Obstructive Sleep Apnea - Discussed pathophysiology of JONI, consequences of untreated JONI and treatment options including PAP therapy, mandibular advancement device, positional therapy, and surgical referral. - Discussed risks of sleepy driving and mitigation strategies (napping). Patient agrees to not drive if tired or sleepy. - Advised avoidance of alcohol and centrally acting medications as these can worsen JONI.  - Racquel presents today for follow-up of moderate obstructive sleep apnea as well as excessive daytime sleepiness.  She has been intermittently using an oral appliance/mandibular advancement device.  Despite using this therapy she reports very mild improvement in her symptoms.  She still reports regular excessive daytime sleepiness and repeated daytime somnolence.  - We discussed utility of PSG/MSLT testing.  We have reviewed the sleep study procedure together in detail today in the office.  I have advised the patient to taper down/off of atomoxetine for 2 weeks prior to the sleep study.  I have asked the patient to complete a sleep journal/sleep log tracking her sleep for 2 weeks prior to the sleep study.  A urine drug screen has been ordered and I have advised the patient to have this completed the week of the sleep study.  - She will return to the office when sleep testing is completed.  Patient verbalized understanding with plan of care and stated that she would do so.    Orders:    Multiple sleep latency test with diagnostic study; Future    Excessive daytime sleepiness  The differential diagnosis for excessive daytime sleepiness is broad.  It includes insufficient sleep, medical and psychiatric conditions, neurologic conditions, central disorders of  "hypersomnolence, or primary sleep disordered breathing.  For PSG/MSLT sleep testing for concerning consideration of a central disorder of hypersomnolence as above.    Orders:    Multiple sleep latency test with diagnostic study; Future    Rapid drug screen, urine; Future    Graves disease  With history of Graves' disease.  She is continued on methimazole.  She continues to follow regularly with endocrinology.  We reviewed and discussed impact of thyroid hormone fluctuations on sleep and daytime sleepiness.  She will continue regular follow-up with endocrinology.         History of Present Illness     Pertinent positives/negatives included in HPI and also as noted:     Objective   /68   Pulse 90   Ht 5' 1\" (1.549 m)   Wt 45.4 kg (100 lb)   SpO2 91%   BMI 18.89 kg/m²        [facility]  Follow-up Visit    Patient Name: Racquel Holguin  Date of Service: 06/02/25  Date of Last Visit: 3/12/2025      PATIENT PROFILE  Ms. Holguin is a 24 y.o. female with a PMH of Graves' disease, nasal septal deviation status post septoplasty, mild obstructive sleep apnea, who presents for follow-up of sleep disordered breathing and excessive daytime sleepiness.      I have reviewed the most recent endocrinology office note from 10/22/2024 with Fouzia Mejia MD.     I have reviewed the most recent otorhinolaryngology note from 8/20/2024 with Tez Mai MD.    Interval History:  Racquel presents today for follow up of excessive daytime sleepiness and mild obstructive sleep apnea.    Since her last office visit she has purchased an oral appliance.  She is intermittently trialed this device for treatment of very mild obstructive sleep apnea on prior home sleep testing.  She reports there has been minimal clinical benefit with regular use of this device.    She reports that she has been following regularly with endocrinology and feels like she is now on a more stable dose of methimazole for Graves' " disease.    Despite these changes, she still reports regular excessive daytime sleepiness and a possible sleep.    She reports episodes of hypnagogic hallucinations.  She is not having regular cataplexy or significant sleep paralysis.    She presents today for follow-up of recent home sleep testing which revealed mild obstructive sleep apnea.    She underwent a home sleep study on 2/14/2025 at a weight of 105 pounds.  This home sleep study revealed mild obstructive sleep apnea with an overall CLAY of 5.7 events per hour.  O2 anil of 79%.  Respiratory events were worse in the supine position.    I reviewed the home sleep study together in detail today with the patient in the office.    Her continued symptoms of daytime somnolence and fatigue have not been mediated with improvement in her thyroid hormone levels as well as treatment for mild obstructive sleep apnea.  We reviewed and discussed that there would be utility to PSG/MSLT testing to assure that there is not an underlying central disorder of hypersomnolence leading to her symptoms.    We reviewed PSG/MSLT testing today in detail during our office visit.  I have asked the patient to taper down and off of atomoxetine approximately 2 weeks prior to the sleep study.  I have asked the patient to complete a sleep log/sleep journal for 2 weeks prior to the sleep study.  A urine drug screen has also been ordered to be completed during the week of the study.    I have asked the patient to return to the office after sleep testing is completed.  Patient verbalized understanding with plan of care and stated that she would do so.    ESS today is 10/24 (normal is < 10).     Sleep-Wake Schedule:   Bedtime: 10pm.   SOL: 30-45 minutes  Nocturnal awakenings: 1-2x, too warm or too cold - temperature changes  Wakeup time: 7:30am with multiple alarms (up to 10)  Naps: Denied     When she does nap she notes that she typically feels worse.  Naps are often unrefreshing.  She is  "requiring multiple alarms to wake first thing in the morning.     Total sleep time estimate: Approximately 8-9 hours.      Weekends are approximately similar to week days.    Patient's, past medical, surgical, social and family histories were reviewed and updated as appropriate.    No Known Allergies    Current Outpatient Medications on File Prior to Visit   Medication Sig    atoMOXetine (STRATTERA) 25 mg capsule Take by mouth in the morning. 40-60MG.    atoMOXetine (STRATTERA) 60 mg capsule     Cholecalciferol (Vitamin D3) 1.25 MG (21808 UT) CAPS Take 50,000 Units by mouth once a week    fluticasone (FLONASE) 50 mcg/act nasal spray SPRAY 1 SPRAY INTO EACH NOSTRIL EVERY DAY    methimazole (TAPAZOLE) 5 mg tablet Use 1 tab and 0.5 tab every other day.     No current facility-administered medications on file prior to visit.       Physical Examination:  Gen: No acute distress, not visibly anxious, speaking comfortably  H&N: MM III; overbite; no retro/micrognathia; no macroglossia; no visible thyromegaly  Neuro: Alert and oriented x3, interactive  Psych: Pleasant, normal affect  Skin: No visible rashes  Respiratory: No accessory muscle use, breathing comfortably  Cardiac: No visible edema of extremities  Abdomen: Soft, NT, no distention  Musculoskeletal: Normal ROM Intact of Extremities      No results found for: \"SODIUM\", \"K\", \"CL\", \"CO2\", \"BUN\", \"CREATININE\", \"GLUC\", \"CALCIUM\"    No results found for: \"WBC\", \"HGB\", \"HCT\", \"MCV\", \"PLT\"    Lab Results   Component Value Date    [id number] <0.010 (L) 12/07/2023    TSH 3.00 04/21/2025       No results found for: \"FERRITIN\"      Recent Weights: Body mass index is 18.89 kg/m².    Results/Data:  I have reviewed the results and report from the 11/10/2023 thyroid ultrasound.     Independent Findings Reviewed: No nodule meets ACR criteria for requiring biopsy or follow-up ultrasounds.  Heterogeneous hypervascular slightly enlarged thyroid glands correlate for autoimmune " "thyroiditis.    I have reviewed the results and report from the 12/22/2024 thyroid ultrasound.    Independent Findings Reviewed: Diffusely heterogeneous and hyperemic gland typically seen in the setting of thyroiditis.  Previously described subcentimeter nodule is indistinct on today's study noting diffuse heterogeneity.    Follow-up will be in 3 months. I encouraged her to contact me with any questions, problems or concerns in the interim.  We will continue with longitudinal follow-up for management of sleep disordered breathing.    Aj David MD  Sleep Medicine and Internal Medicine  [facility]  06/02/25      Portions of the record may have been created with voice recognition software.  Occasional wrong word or \"sound a like\" substitutions may have occurred due to the inherent limitations of voice recognition software.  Read the chart carefully and recognize, using context, where substitutions have occurred.     "

## 2025-06-17 ENCOUNTER — RESULTS FOLLOW-UP (OUTPATIENT)
Dept: OTHER | Facility: HOSPITAL | Age: 25
End: 2025-06-17

## 2025-06-17 LAB
T3 SERPL-MCNC: 94 NG/DL (ref 76–181)
T4 FREE SERPL-MCNC: 1.1 NG/DL (ref 0.8–1.8)
TSH SERPL-ACNC: 1.78 MIU/L

## 2025-07-07 ENCOUNTER — HOSPITAL ENCOUNTER (OUTPATIENT)
Dept: SLEEP CENTER | Facility: CLINIC | Age: 25
Discharge: HOME | End: 2025-07-07
Attending: STUDENT IN AN ORGANIZED HEALTH CARE EDUCATION/TRAINING PROGRAM
Payer: COMMERCIAL

## 2025-07-07 DIAGNOSIS — G47.33 MILD OBSTRUCTIVE SLEEP APNEA: ICD-10-CM

## 2025-07-07 DIAGNOSIS — G47.19 EXCESSIVE DAYTIME SLEEPINESS: ICD-10-CM

## 2025-07-07 PROCEDURE — 95810 POLYSOM 6/> YRS 4/> PARAM: CPT

## 2025-07-07 PROCEDURE — 95810 POLYSOM 6/> YRS 4/> PARAM: CPT | Performed by: STUDENT IN AN ORGANIZED HEALTH CARE EDUCATION/TRAINING PROGRAM

## 2025-07-08 ENCOUNTER — HOSPITAL ENCOUNTER (OUTPATIENT)
Dept: SLEEP CENTER | Facility: CLINIC | Age: 25
Discharge: HOME | End: 2025-07-08
Attending: STUDENT IN AN ORGANIZED HEALTH CARE EDUCATION/TRAINING PROGRAM
Payer: COMMERCIAL

## 2025-07-08 PROBLEM — G47.19 EXCESSIVE DAYTIME SLEEPINESS: Status: ACTIVE | Noted: 2025-07-08

## 2025-07-08 LAB
AMPHETAMINES SERPL QL SCN: NEGATIVE
BARBITURATES UR QL: NEGATIVE
BENZODIAZ UR QL: NEGATIVE
COCAINE UR QL: NEGATIVE
FENTANYL UR QL SCN: NEGATIVE
HYDROCODONE UR QL SCN: NEGATIVE
METHADONE UR QL: NEGATIVE
OPIATES UR QL SCN: NEGATIVE
OXYCODONE+OXYMORPHONE UR QL SCN: NEGATIVE
PCP UR QL: NEGATIVE
THC UR QL: NEGATIVE

## 2025-07-08 PROCEDURE — 95805 MULTIPLE SLEEP LATENCY TEST: CPT | Performed by: STUDENT IN AN ORGANIZED HEALTH CARE EDUCATION/TRAINING PROGRAM

## 2025-07-08 PROCEDURE — 95805 MULTIPLE SLEEP LATENCY TEST: CPT

## 2025-07-08 PROCEDURE — 80307 DRUG TEST PRSMV CHEM ANLYZR: CPT | Performed by: STUDENT IN AN ORGANIZED HEALTH CARE EDUCATION/TRAINING PROGRAM

## 2025-07-08 NOTE — PROGRESS NOTES
Medication Review  The patient was provided a list of their current medications in EPIC.  The patient was asked to review the list and update any changes.    Patient reports: Changes in medication. no       Patient currently using marijuana (medical or recreational): no      Patient currently using nicotine: no      Pre-Sleep Study       Sleep testing procedure explained to patient:YES    Urine drug screen performed: Yes    Study Documentation    Patient reports:    Nap: 1: Sleep yes Dream no    Nap 2:  Sleep yes Dream no    Nap 3:  Sleep no Dream no    Nap 4: Sleep no Dream no    Nap 5:  Sleep no Dream no    EKG abnormalities: no     EEG abnormalities: no    Naps completed: 5

## 2025-07-08 NOTE — PROGRESS NOTES
Sleep Study Documentation    Pre-Sleep Study       Sleep testing procedure explained to patient:YES    Patient napped prior to study:NO    Caffeine:Dayshift worker after 12PM.  Caffeine use:NO    Alcohol:Dayshift workers after 5PM: Alcohol use:NO    Typical day for patient:YES         Study Documentation    Sleep Study Indications: excessive daytime sleepiness  restless sleep and suspected narcolepsy    Montage used: Standard        Oxygen Data  Supplemental O2 used: No      EKG/EEG/Abnormal Behaviors     EKG abnormalities: no None    EEG abnormalities: no    Were abnormal behaviors in sleep observed:NO  No    Snoring    Character:  None    Frequency: Not present      Study Length    Is Total Sleep Study Recording Time < 2 hours: N/A    Is Total Sleep Study Recording Time > 2 hours but study is incomplete: N/A    Is Total Sleep Study Recording Time 6 hours or more but sleep was not obtained: NO        Post-Sleep Study    Medication used at bedtime or during sleep study:NO    Patient reports time it took to fall asleep:30 to 60 minutes    Patient reports waking up during study:1 to 2 times.  Patient reports returning to sleep in 10 to 30 minutes.    Patient reports sleeping 6 to 8 hours with dreaming.    Does the Patient feel this is a typical night of sleep:typical    Patient rated sleepiness: Somewhat sleepy or tired

## 2025-07-11 PROBLEM — G47.9 SLEEP DISORDER, UNSPECIFIED: Status: ACTIVE | Noted: 2025-07-11

## 2025-07-17 ENCOUNTER — TELEPHONE (OUTPATIENT)
Age: 25
End: 2025-07-17

## 2025-07-17 ENCOUNTER — OFFICE VISIT (OUTPATIENT)
Dept: SLEEP CENTER | Facility: CLINIC | Age: 25
End: 2025-07-17
Payer: COMMERCIAL

## 2025-07-17 VITALS
OXYGEN SATURATION: 97 % | DIASTOLIC BLOOD PRESSURE: 60 MMHG | BODY MASS INDEX: 18.61 KG/M2 | WEIGHT: 98.6 LBS | HEART RATE: 71 BPM | HEIGHT: 61 IN | SYSTOLIC BLOOD PRESSURE: 90 MMHG

## 2025-07-17 DIAGNOSIS — E05.00 GRAVES DISEASE: ICD-10-CM

## 2025-07-17 DIAGNOSIS — G47.419 PRIMARY NARCOLEPSY WITHOUT CATAPLEXY: Primary | ICD-10-CM

## 2025-07-17 DIAGNOSIS — J34.89 NASAL OBSTRUCTION: ICD-10-CM

## 2025-07-17 PROBLEM — G47.33 OBSTRUCTIVE SLEEP APNEA: Status: RESOLVED | Noted: 2025-02-19 | Resolved: 2025-07-17

## 2025-07-17 PROCEDURE — 99214 OFFICE O/P EST MOD 30 MIN: CPT | Performed by: STUDENT IN AN ORGANIZED HEALTH CARE EDUCATION/TRAINING PROGRAM

## 2025-07-17 RX ORDER — MODAFINIL 100 MG/1
100 TABLET ORAL DAILY
Qty: 30 TABLET | Refills: 0 | Status: SHIPPED | OUTPATIENT
Start: 2025-07-17

## 2025-07-17 NOTE — ASSESSMENT & PLAN NOTE
With history of nasal obstruction and nasal septal deviation status post septoplasty.  Encouraged regular control of any allergic rhinitis or seasonal allergy symptoms for improvement in sleep-related breathing.

## 2025-07-17 NOTE — TELEPHONE ENCOUNTER
PA for MODAFINIL 100 MG  APPROVED     Date(s) approved 7/17/25-7/17/26    Case #  [id number]        Patient advised by          [x][email or web address] Message  []Phone call   [x]LMOM  []L/M to call office as no active Communication consent on file  []Unable to leave detailed message as VM not approved on Communication consent       Pharmacy advised by    []Fax  []Phone call  []Secure Chat    Specialty Pharmacy    []     Approval letter scanned into Media Yes

## 2025-07-17 NOTE — PROGRESS NOTES
Name: Racquel Holguin      : 2000      MRN: [id number]  Encounter Provider: Aj David MD  Encounter Date: 2025   Encounter department: [facility]    :  Assessment & Plan  Primary narcolepsy without cataplexy  Racquel presents today for follow-up of recent PSG/MSLT testing.  She has had longstanding excessive daytime sleepiness and daytime somnolence.  She also has episodes of reported hypnagogue hallucinations without having regular cataplexy or significant sleep paralysis.  -We reviewed her recent diagnostic PSG/MSLT today in detail.  Of note she was off any REM suppressing medications for this study.  Her urine drug screen was negative.  I reviewed in detail today of her proceeding sleep schedule for 2 weeks prior to testing revealed appropriate sleep time.  It appears she was getting greater than 8 hours of sleep consistently.  The patient verbalized this during our discussion during today's office visit.  -On her initial diagnostic polysomnogram she slept for greater then 6 hours.  There was overall REM latency of 55 minutes.  There was a total of 30.7% of REM sleep.  On the subsequent mean sleep latency test the patient slept in 5 out of 5 naps.  She had 2 sleep onset REM periods.  Her overall mean sleep latency was 12.2 minutes.  -Her symptoms of hypnagogue hallucinations, continued excessive daytime sleepiness, presence of 2 sleep onset REM periods, as well as sleep and all 5 out of 5 nap periods is highly suggestive of a central disorder of hypersomnolence such as type II narcolepsy.  -It is notable that the patient was previously on atomoxetine and noted significant benefit and improvement in her daytime somnolence and sleepiness and overall quality of life.   - We have discussed and reviewed that her findings are quite suggestive of type II narcolepsy.  We reviewed and discussed that false negative testing can occur.  We have discussed utility of possible repeat  "sleep testing.  -The patient elects to start treatment for type II narcolepsy.  We have discussed treatment with weight promoting agents such as modafinil.  I have reviewed and discussed side effects of modafinil.  I have asked the patient to stop the medication and contact our office at any time if she develops a rash.  I have also advised the patient that modafinil may reduce the effectiveness of oral contraceptive pills or birth control.  I have asked the patient to utilize 2 forms of birth control if sexually active.  -The patient verbalized understanding and agreement with plan of care.  She is amenable to starting treatment with modafinil.  I have asked the patient to return to the office within 4 to 6 weeks for close clinical follow-up.  Patient verbalized understanding and stated that she would do so.  Orders:    modafinil (PROVIGIL) 100 mg tablet; Take 1 tablet (100 mg total) by mouth daily    Graves disease  With history of Graves' disease on methimazole.  She continues to follow regularly with endocrinology.       Nasal obstruction  With history of nasal obstruction and nasal septal deviation status post septoplasty.  Encouraged regular control of any allergic rhinitis or seasonal allergy symptoms for improvement in sleep-related breathing.         History of Present Illness     HPI                                                  Review of Systems  Pertinent positives/negatives included in HPI and also as noted:         Objective   BP 90/60 (BP Location: Left arm, Patient Position: Sitting, Cuff Size: Adult)   Pulse 71   Ht 5' 1\" (1.549 m)   Wt 44.7 kg (98 lb 9.6 oz)   SpO2 97%   BMI 18.63 kg/m²        [facility]  Follow-up Visit    Patient Name: Racquel Holguin  Date of Service: 07/17/25  Date of Last Visit: 6/2/2025      PATIENT PROFILE  Ms. Holguin is a 24 y.o. female with a PMH of Graves' disease, nasal septal deviation status post septoplasty, who presents for follow-up of sleep " disordered breathing and excessive daytime sleepiness.      I have reviewed the most recent endocrinology office note from 10/22/2024 with Fouzia Mejia MD.     I have reviewed the most recent otorhinolaryngology note from 8/20/2024 with Tez Mai MD.    Interval History:  Racquel presents today for follow-up of excessive daytime sleepiness.    ESS today is 12/24 (normal is < 10).     She previously completed a home sleep study on 2/14/2025 at a weight of 105 pounds. This home sleep study revealed mild obstructive sleep apnea with an overall CLAY of 5.7 events per hour. O2 anil of 79%. Respiratory events were worse in the supine position.     She completed a diagnostic PSG/MSLT on 7/7/2025 - 7/8/2025.    On the initial diagnostic polysomnogram the patient slept for 362 minutes.  Sleep latency 803 minutes.  There was a REM latency of 55.5 minutes, 30.7% REM sleep during the study.  There is no evidence for clinically significant sleep disordered breathing or obstructive sleep apnea with an overall AHI of 0.2 events per hour.  Decrease sleep efficiency during the study of 64.4%.    On the subsequent mean sleep latency test, the patient slept in 5 out of 5 naps.  She had a total mean sleep latency of 12.2 minutes.  There were 2 sleep onset REM periods.    We reviewed this PSG/MSLT as well as prior sleep testing together in detail during today's office visit.    She continues to report endorse hypnagogue hallucinations.  There are no reported episodes of cataplexy or sleep paralysis.    Since her last office visit she has discontinued atomoxetine in preparation for the PSG/MSLT.  She has noted significant increase in daytime sleepiness and fatigue since discontinuing this medication.  She also completed a urine drug screen prior to sleep testing which was negative.  I reviewed her sleep journal/diary for 2 weeks prior to her most recent sleep study which revealed sufficient sleep.    We reviewed and discussed  that her recent PSG/MSLT was strongly suggestive of possible diagnosis of type II narcolepsy.  Given her significant daytime impairment and excessive daytime sleepiness symptoms the patient is amenable to treatment.  We have also reviewed and discussed the possibility of false negative testing and night to night variability.    Patient elects to start treatment with modafinil.  We reviewed treatment with wake promoting agents.  I have reviewed side effects together in detail today with the patient in the office.  I have asked the patient to contact our office at any time if she develops a rash and to discontinue the medication immediately.  I have also reviewed that modafinil can decrease the effectiveness of oral contraceptive pills or birth control.  I have asked the patient to utilize 2 methods of birth control if she is sexually active.    Patient verbalized understanding and agreement with plan of care.  I have asked the patient to return to the office for close clinical follow-up within 4 to 6 weeks.  Patient verbalized understanding and stated that she would do so.    Sleep-Wake Schedule:   Bedtime: 10pm.   SOL: 30-45 minutes  Nocturnal awakenings: 1-2x, too warm or too cold - temperature changes  Wakeup time: 7:30am with multiple alarms (up to 10)  Naps: Denied     When she does nap she notes that she typically feels worse.  Naps are often unrefreshing.  She is requiring multiple alarms to wake first thing in the morning.     Total sleep time estimate: Approximately 8-9 hours.     Patient's, past medical, surgical, social and family histories were reviewed and updated as appropriate.    Allergies[1]    Current Outpatient Medications on File Prior to Visit   Medication Sig    methimazole (TAPAZOLE) 5 mg tablet Use 1 tab and 0.5 tab every other day.    atoMOXetine (STRATTERA) 25 mg capsule Take by mouth in the morning. 40-60MG. (Patient not taking: Reported on 7/17/2025)    atoMOXetine (STRATTERA) 60 mg  "capsule  (Patient not taking: Reported on 7/17/2025)    Cholecalciferol (Vitamin D3) 1.25 MG (69407 UT) CAPS Take 50,000 Units by mouth once a week (Patient not taking: Reported on 7/17/2025)    fluticasone (FLONASE) 50 mcg/act nasal spray SPRAY 1 SPRAY INTO EACH NOSTRIL EVERY DAY (Patient not taking: Reported on 7/17/2025)     No current facility-administered medications on file prior to visit.       Physical Examination:  Gen: No acute distress, not visibly anxious, speaking comfortably  H&N: MM III; overbite; no retro/micrognathia; no macroglossia; no visible thyromegaly  Neuro: Alert and oriented x3, interactive  Psych: Pleasant, normal affect  Skin: No visible rashes  Respiratory: No accessory muscle use, breathing comfortably  Cardiac: No visible edema of extremities  Abdomen: Soft, NT, no distention  Musculoskeletal: Normal ROM Intact of Extremities      No results found for: \"SODIUM\", \"K\", \"CL\", \"CO2\", \"BUN\", \"CREATININE\", \"GLUC\", \"CALCIUM\"    No results found for: \"WBC\", \"HGB\", \"HCT\", \"MCV\", \"PLT\"    Lab Results   Component Value Date    [id number] <0.010 (L) 12/07/2023    TSH 1.78 06/16/2025       No results found for: \"FERRITIN\"      Recent Weights: Body mass index is 18.63 kg/m².    Results/Data:  I have reviewed the results and report from the 11/10/2023 thyroid ultrasound.      Independent Findings Reviewed: No nodule meets ACR criteria for requiring biopsy or follow-up ultrasounds.  Heterogeneous hypervascular slightly enlarged thyroid glands correlate for autoimmune thyroiditis.     I have reviewed the results and report from the 12/22/2024 thyroid ultrasound.     Independent Findings Reviewed: Diffusely heterogeneous and hyperemic gland typically seen in the setting of thyroiditis.  Previously described subcentimeter nodule is indistinct on today's study noting diffuse heterogeneity.    Follow-up will be in 4-6 weeks. I encouraged her to contact me with any questions, problems or concerns in the " "interim.  We will continue with longitudinal follow-up for management of sleep disordered breathing.    Aj David MD  Sleep Medicine and Internal Medicine  [facility]  07/17/25      Portions of the record may have been created with voice recognition software.  Occasional wrong word or \"sound a like\" substitutions may have occurred due to the inherent limitations of voice recognition software.  Read the chart carefully and recognize, using context, where substitutions have occurred.            [1] No Known Allergies    "

## 2025-07-17 NOTE — PATIENT INSTRUCTIONS
"Patient Education     Narcolepsy   The Basics   Written by the doctors and editors at [facility]   What is narcolepsy? -- Narcolepsy is a brain disorder that makes you feel sleepy most of the time. People with narcolepsy sometimes fall asleep all of a sudden, even when they don't expect to. They can even fall asleep while they are in the middle of activities, such as eating, talking, or driving.  People usually develop narcolepsy during their teens or early 20s. Some people get it earlier and others later. Once it starts, the disorder can make it hard to work, do schoolwork, or do other normal activities.  What are the symptoms of narcolepsy? -- The symptoms can include:   Feeling sleepy during the day   Falling asleep all of a sudden, often at inappropriate times - Some people call these \"sleep attacks.\"   Suddenly falling down, going limp, or feeling weak, especially when excited, angry, or laughing - The medical term for this is \"cataplexy.\"   Being unable to move or speak in the few moments right after waking or just before falling asleep   Seeing, feeling, or hearing things that are not really there in the few moments before falling asleep or right after waking up - This can be scary and feel very real.  People with narcolepsy often have trouble sleeping at night, even if they are tired. They might fall asleep easily but then wake up several times during the night.  Some people also have problems with depression or anxiety. Symptoms of depression include feeling sad most of the time, or losing interest in things that you used to like to do. Symptoms of anxiety include feeling worried most of the time.  Should I see a doctor or nurse? -- Yes. If you have symptoms of narcolepsy, see your doctor or nurse. The symptoms can be dangerous if they happen while you are driving or doing something that could lead to a fall or injury.  You should also talk to your doctor or nurse if you think that you might have depression " "or anxiety. There are treatments that can help.  Will I need tests? -- Yes. If your doctor or nurse suspects that you have narcolepsy, they might send you for a \"sleep study.\" For the study, you go to a sleep lab, where you are hooked up to different machines that monitor your heart rate, breathing, brain activity, and movements while you sleep at night. Several hours after the sleep study is done, another test is done in which the lights are dimmed and you are given privacy and asked to try napping several times.  People with narcolepsy have abnormal sleep patterns during naps and at night. These abnormal patterns can be detected during the studies.  How is narcolepsy treated? -- Narcolepsy is usually treated with behavior changes and medicines. People with the disorder should:   Avoid medicines that can cause sleepiness, such as some allergy medicines.   Take naps just before important events and at scheduled times during the day.   Keep a regular sleep schedule.   Try to get enough sleep at night.  People who are still very sleepy even if they make these changes can be treated with medicines to help them stay awake. These medicines can help, but even with treatment, people can still feel sleepy. That's why even people who are being treated have to be careful about the activities they do. Driving, for example, can be dangerous for some people with narcolepsy. Work with your doctor to make a plan that is safe for you.  The medicines used to help people stay awake can sometimes cause high blood pressure, decreased appetite, and other problems. If your doctor prescribes you 1 of these medicines, make sure that you understand the risks.  People who have muscle weakness or go limp when they feel strong emotions can get medicines to help with this, too.  Is there anything I can do on my own to deal with narcolepsy? -- If you have narcolepsy, think about seeing a counselor and try to find support at work or school. This " condition can make you feel sad, frustrated, and embarrassed. Plus, other people who do not understand the condition can sometimes treat you as if you are lazy or accuse you of avoiding things. All of this can be hard to deal with, so it can help to have someone to talk to.  All topics are updated as new evidence becomes available and our peer review process is complete.  This topic retrieved from [employer] on: Feb 26, 2024.  Topic 70071 Version 10.0  Release: 32.2.4 - C32.56  © 2024 [employer] and/or its affiliates. All rights reserved.  Consumer Information Use and Disclaimer   Disclaimer: This generalized information is a limited summary of diagnosis, treatment, and/or medication information. It is not meant to be comprehensive and should be used as a tool to help the user understand and/or assess potential diagnostic and treatment options. It does NOT include all information about conditions, treatments, medications, side effects, or risks that may apply to a specific patient. It is not intended to be medical advice or a substitute for the medical advice, diagnosis, or treatment of a health care provider based on the health care provider's examination and assessment of a patient's specific and unique circumstances. Patients must speak with a health care provider for complete information about their health, medical questions, and treatment options, including any risks or benefits regarding use of medications. This information does not endorse any treatments or medications as safe, effective, or approved for treating a specific patient. Up[employer], Inc[employer] and its affiliates disclaim any warranty or liability relating to this information or the use thereof.The use of this information is governed by the Terms of Use, available at https://www.wolterskluwer.com/en/know/clinical-effectiveness-terms. 2024© UpToDate, Inc. and its affiliates and/or licensors. All rights reserved.  Copyright   © 2024 [employer], [employer]. and/or  its affiliates. All rights reserved.

## 2025-07-17 NOTE — ASSESSMENT & PLAN NOTE
With history of Graves' disease on methimazole.  She continues to follow regularly with endocrinology.

## 2025-07-17 NOTE — TELEPHONE ENCOUNTER
PA for MODAFINIL 100 MG SUBMITTED to [employer] [facility]     via    []CMM-KEY:   [x]Surescripts-Case ID #   [id number]   []Availity-Auth ID # NDC #   []Faxed to plan   []Other website   []Phone call Case ID #     []PA sent as URGENT    All office notes, labs and other pertaining documents and studies sent. Clinical questions answered. Awaiting determination from insurance company.     Turnaround time for your insurance to make a decision on your Prior Authorization can take 7-21 business days.                  Supple/No adenopathy

## 2025-07-18 ENCOUNTER — TELEPHONE (OUTPATIENT)
Age: 25
End: 2025-07-18

## 2025-07-23 NOTE — TELEPHONE ENCOUNTER
Duplicate encounter created, please see telephone encounter from 7/17/2025 regarding   modafinil (PROVIGIL) 100 mg PA status. Please review patient's chart to see if there is already an encounter regarding the medication in question and to document anything regarding this medication in regards to anything regarding the authorization process etc before creating another encounter Thank You.

## 2025-07-24 ENCOUNTER — TELEPHONE (OUTPATIENT)
Dept: SLEEP CENTER | Facility: CLINIC | Age: 25
End: 2025-07-24

## 2025-08-18 ENCOUNTER — TELEMEDICINE (OUTPATIENT)
Dept: SLEEP CENTER | Facility: CLINIC | Age: 25
End: 2025-08-18
Payer: COMMERCIAL

## 2025-08-18 DIAGNOSIS — J34.89 NASAL OBSTRUCTION: ICD-10-CM

## 2025-08-18 DIAGNOSIS — E05.00 GRAVES DISEASE: ICD-10-CM

## 2025-08-18 DIAGNOSIS — G47.419 PRIMARY NARCOLEPSY WITHOUT CATAPLEXY: Primary | ICD-10-CM

## 2025-08-18 PROCEDURE — 99214 OFFICE O/P EST MOD 30 MIN: CPT | Performed by: STUDENT IN AN ORGANIZED HEALTH CARE EDUCATION/TRAINING PROGRAM

## 2025-08-18 RX ORDER — MODAFINIL 100 MG/1
100 TABLET ORAL DAILY
Qty: 30 TABLET | Refills: 0 | Status: SHIPPED | OUTPATIENT
Start: 2025-08-18 | End: 2025-08-19 | Stop reason: SDUPTHER

## 2025-08-19 DIAGNOSIS — G47.419 PRIMARY NARCOLEPSY WITHOUT CATAPLEXY: ICD-10-CM

## 2025-08-20 RX ORDER — MODAFINIL 100 MG/1
100 TABLET ORAL DAILY
Qty: 30 TABLET | Refills: 0 | Status: SHIPPED | OUTPATIENT
Start: 2025-08-20

## 2025-08-22 ENCOUNTER — TELEPHONE (OUTPATIENT)
Dept: SLEEP CENTER | Facility: CLINIC | Age: 25
End: 2025-08-22